# Patient Record
Sex: FEMALE | Race: WHITE | Employment: UNEMPLOYED | URBAN - METROPOLITAN AREA
[De-identification: names, ages, dates, MRNs, and addresses within clinical notes are randomized per-mention and may not be internally consistent; named-entity substitution may affect disease eponyms.]

---

## 2017-06-13 ENCOUNTER — OFFICE VISIT (OUTPATIENT)
Dept: VASCULAR SURGERY | Age: 27
End: 2017-06-13

## 2017-06-13 VITALS
DIASTOLIC BLOOD PRESSURE: 70 MMHG | BODY MASS INDEX: 27.2 KG/M2 | WEIGHT: 190 LBS | SYSTOLIC BLOOD PRESSURE: 118 MMHG | HEIGHT: 70 IN | RESPIRATION RATE: 18 BRPM | HEART RATE: 82 BPM

## 2017-06-13 DIAGNOSIS — I83.813 VARICOSE VEINS WITH PAIN, BILATERAL: Primary | ICD-10-CM

## 2017-06-13 DIAGNOSIS — M79.89 LEG SWELLING: ICD-10-CM

## 2017-06-13 NOTE — PROGRESS NOTES
Preeti Kimble is a 31 y.o. female    Chief Complaint   Patient presents with    Varicose Veins         1. Have you been to the ER, urgent care clinic or hospitalized since your last visit? NO.     2. Have you seen or consulted any other health care providers outside of the [address] [place] [address] since your last visit (Include any pap smears or colon screening)?  NO

## 2017-06-14 PROBLEM — I83.819 VARICOSE VEINS WITH PAIN: Status: ACTIVE | Noted: 2017-06-14

## 2017-06-14 PROBLEM — M79.89 LEG SWELLING: Status: ACTIVE | Noted: 2017-06-14

## 2017-06-14 NOTE — PROGRESS NOTES
Shena Keys    Chief Complaint   Patient presents with   [address] [place] [address] Varicose Veins       History and Physical    Ms. Jeet Adame is a 31year old female referred to our office for evaluation of her bilateral painful varicose veins. She states that her varicosities have worsened after every pregnancy, the last of which was last year. Since that time, especially on her left thigh, her varicose veins have become so painful that when her son brushes against them it causes her excruciating pain. She has tried compression stockings and elevation, but this seems to exacerbate her symptoms. Her stockings are knee high. She also does not like to take medications, but has tried Tumeric as an anti inflammatory measure during flare ups but has not received any relief from this. Past Medical History:   Diagnosis Date    Thyroid disease     hypothyroidism     There is no problem list on file for this patient. History reviewed. No pertinent surgical history. Current Outpatient Prescriptions   Medication Sig Dispense Refill    MAGNESIUM PO Take  by mouth.  CHOLECALCIFEROL, VITAMIN D3, (VITAMIN D3 PO) Take  by mouth.  OTHER        Not on File  Social History     Social History    Marital status: UNKNOWN     Spouse name: N/A    Number of children: N/A    Years of education: N/A     Occupational History    Not on file. Social History Main Topics    Smoking status: Never Smoker    Smokeless tobacco: Not on file    Alcohol use Yes      Comment: type of drink varies but about 1 drink per week.  Drug use: No    Sexual activity: Not on file     Other Topics Concern    Not on file     Social History Narrative      Family History   Problem Relation Age of Onset    Other Mother      neurological disorder, varicose veins    Diabetes Father      type 2    Cancer Maternal Grandmother      stage four lung, liver, kidneys and a tumor at base of skull.      Cancer Maternal Grandfather      colon cancer  COPD Maternal Grandfather     Alzheimer Paternal Grandmother        Review of Systems    Review of Systems   Constitutional: Negative for chills, diaphoresis, fever, malaise/fatigue and weight loss. HENT: Negative for hearing loss and sore throat. Eyes: Negative for blurred vision, photophobia and redness. Respiratory: Negative for cough, hemoptysis, shortness of breath and wheezing. Cardiovascular: Positive for leg swelling. Negative for chest pain, palpitations and orthopnea. Gastrointestinal: Negative for abdominal pain, blood in stool, constipation, diarrhea, heartburn, nausea and vomiting. Genitourinary: Negative for dysuria, frequency, hematuria and urgency. Musculoskeletal: Negative for back pain and myalgias. Skin: Negative for itching and rash. Neurological: Negative for dizziness, speech change, focal weakness, weakness and headaches. Endo/Heme/Allergies: Does not bruise/bleed easily. Psychiatric/Behavioral: Negative for depression and suicidal ideas.             Physical Exam:    Visit Vitals    /70 (BP 1 Location: Right arm, BP Patient Position: Sitting)    Pulse 82    Resp 18    Ht 5' 9.5\" (1.765 m)    Wt 190 lb (86.2 kg)    LMP 05/17/2017    BMI 27.66 kg/m2      Physical Examination: General appearance - alert, well appearing, and in no distress  Mental status - alert, oriented to person, place, and time  Eyes - sclera anicteric, left eye normal, right eye normal  Ears - right ear normal, left ear normal  Nose - normal and patent, no erythema, discharge or polyps  Mouth - mucous membranes moist, pharynx normal without lesions  Neck - supple, no significant adenopathy  Lymphatics - no palpable lymphadenopathy  Chest - clear to auscultation, no wheezes, rales or rhonchi, symmetric air entry  Heart - normal rate and regular rhythm  Abdomen - soft, nontender, nondistended, no masses or organomegaly  Extremities - Bilateral lower extremities warm and well perfused without significant edema. Bilateral, but left worse than right, large varicosities starting at mid calf and extending up into the high to mid thighs. No wounds or ulcerations. No erythema. No lipodermatosclerosis. No hyperpigmentation      Impression and Plan:    ICD-10-CM ICD-9-CM    1. Varicose veins with pain, bilateral I83.813 454.8 DUPLEX LOWER EXT VENOUS BILAT   2. Leg swelling M79.89 729.81 DUPLEX LOWER EXT VENOUS BILAT     Orders Placed This Encounter    DUPLEX LOWER EXT VENOUS BILAT    MAGNESIUM PO    CHOLECALCIFEROL, VITAMIN D3, (VITAMIN D3 PO)    OTHER     I told Ms. Andrea Salazar that I believe she has significant venous hypertension. I commended her on trying compression stockings, but I think she may find more relief from thigh high compression. We have directed her to a pharmacy to obtain those. We have also ordered a venous reflux study to identify the source of her problems. I believe that she would benefit from a venous closure and we will discuss this further when she returns in 1 month after her reflux study and trying her new stockings. Follow-up Disposition:  Return in about 4 weeks (around 7/11/2017). The treatment plan was reviewed with the patient in detail. The patient voiced understanding of this plan and all questions and concerns were addressed. The patient agrees with this plan. We discussed the signs and symptoms that would require earlier attention or intervention. The patient was given educational material related to his/her visit and the patient has voiced understanding of the material.     I appreciate the opportunity to participate in the care of your patient. I will be sure to keep you informed of any subsequent changes in the treatment plan. If you have any questions or concerns, please feel free to contact me. Darlene Manriquez MD    PLEASE NOTE:  This document has been produced using voice recognition software.  Unrecognized errors in transcription may be present.

## 2017-06-29 DIAGNOSIS — I83.813 VARICOSE VEINS OF BILATERAL LOWER EXTREMITIES WITH PAIN: Primary | ICD-10-CM

## 2017-06-29 NOTE — PROGRESS NOTES
Type of procedure: reflux study       Weight in appropriate: WNL    Name of prescriber: Dr. Caren Morgan    Date and time order was received: 6/29/17 @ 3:30 pm

## 2017-07-13 ENCOUNTER — OFFICE VISIT (OUTPATIENT)
Dept: VASCULAR SURGERY | Age: 27
End: 2017-07-13

## 2017-07-13 DIAGNOSIS — I83.813 VARICOSE VEINS WITH PAIN, BILATERAL: ICD-10-CM

## 2017-07-13 DIAGNOSIS — I83.813 VARICOSE VEINS OF BILATERAL LOWER EXTREMITIES WITH PAIN: ICD-10-CM

## 2017-07-13 DIAGNOSIS — M79.89 LEG SWELLING: ICD-10-CM

## 2017-07-13 NOTE — PROCEDURES
[facility]   *** FINAL REPORT ***    Name: Tomi Mccracken  MRN: [id number]       Outpatient  : 10 Jul 1990  HIS Order #: [id number]  [address] [facility] Visit #: [id number]  Date: 2017    TYPE OF TEST: Peripheral Venous Testing    REASON FOR TEST  Venous Insufficiency    Right Leg:-  Deep venous thrombosis:           No  Superficial venous thrombosis:    No  Deep venous insufficiency:        Yes  Superficial venous insufficiency: Yes    Left Leg:-  Deep venous thrombosis:           No  Superficial venous thrombosis:    No  Deep venous insufficiency:        Yes  Superficial venous insufficiency: Yes    Abnormal Valve Closure Times (seconds):    Right Common Femoral: 1.7    Right Deep Femoral:   1.7    Right GSV proximal:   3.7    Right GSV mid:        0.7    Left Common Femoral:  1.0    Left SFJ:             2.2    Vein Mapping:    Diam.   Depth  (mm)    Right Great Saphenous Vein:    High Thigh:      6.4    Mid Thigh:       5.0    Low Thigh:       3.4    Knee:            3.4    High Calf:       3.5    Low Calf: Ankle:    Right Small Saphenous Vein:    SPJ:    Mid Calf: Ankle:    Giacomini:  Accessory saph.:  Hinton :  Shyann Arthur :    Left Great Saphenous Vein:    High Thigh:      7.6    Mid Thigh:       2.5    Low Thigh:       1.3    Knee:    High Calf:    Low Calf: Ankle:    Left Small Saphenous Vein:    SPJ:    Mid Calf: Ankle:    Giacomini:  Accessory saph.:  Hinton :  Cole :      INTERPRETATION/FINDINGS  Duplex images were obtained using 2-D gray scale, color flow and  spectral doppler analysis. The reflux exam was performed in the reverse   trendelenburg position. Bilateral reflux:  1. No evidence of deep vein thrombosis in the common femoral, proximal   deep femoral, femoral, popliteal, posterior tibial and peroneal veins   bilaterally. 2. Deep venous reflux in the common femoral veins bilaterally and in  the right deep femoral vein.   3. Right great saphenous vein reflux  in the proximal to distal thigh. No reflux at the sapheno-femoral junction. 4. Left great saphenous vein reflux at the sapheno-femoral junction  with the vein becoming small in the distal thigh with varices  extending down the calf. 5. No small saphenous vein reflux bilaterally. 6. Biphasic posterior tibial artery flow bilaterally. ADDITIONAL COMMENTS    I have personally reviewed the data relevant to the interpretation of  this  study. TECHNOLOGIST: Lisandra Kwok RVT, BETOMS  Signed: 07/13/2017 03:36 PM    PHYSICIAN: Benita Guzman D.O.   Signed: 07/13/2017 05:20 PM

## 2017-07-25 ENCOUNTER — OFFICE VISIT (OUTPATIENT)
Dept: VASCULAR SURGERY | Age: 27
End: 2017-07-25

## 2017-07-25 VITALS
DIASTOLIC BLOOD PRESSURE: 64 MMHG | RESPIRATION RATE: 18 BRPM | WEIGHT: 190 LBS | HEART RATE: 80 BPM | HEIGHT: 70 IN | SYSTOLIC BLOOD PRESSURE: 108 MMHG | BODY MASS INDEX: 27.2 KG/M2

## 2017-07-25 DIAGNOSIS — I87.2 SAPHENOFEMORAL VENOUS REFLUX: ICD-10-CM

## 2017-07-25 DIAGNOSIS — I83.813 VARICOSE VEINS WITH PAIN, BILATERAL: Primary | ICD-10-CM

## 2017-08-15 DIAGNOSIS — I83.813 VARICOSE VEINS WITH PAIN, BILATERAL: Primary | ICD-10-CM

## 2017-08-15 RX ORDER — LORAZEPAM 1 MG/1
TABLET ORAL
Qty: 3 TAB | Refills: 0 | Status: SHIPPED | OUTPATIENT
Start: 2017-08-15 | End: 2017-09-27

## 2017-08-15 NOTE — TELEPHONE ENCOUNTER
Order for Ativan 1 mg to be taken as directed and brought to procedure #3/0 placed per Verbal order from Dr. Lamar Richmond . Pt verbalized understanding .

## 2017-08-31 ENCOUNTER — OFFICE VISIT (OUTPATIENT)
Dept: VASCULAR SURGERY | Age: 27
End: 2017-08-31

## 2017-08-31 DIAGNOSIS — I87.2 SAPHENOFEMORAL VENOUS REFLUX: ICD-10-CM

## 2017-08-31 DIAGNOSIS — I83.813 VARICOSE VEINS WITH PAIN, BILATERAL: Primary | ICD-10-CM

## 2017-08-31 NOTE — PROGRESS NOTES
Ms. Harvey Johansen presents to our office for a left greater saphenous vein ablation. She has large symptomatic left thigh varices with pain, swelling and heaviness despite wearing compression stockings. She has documented venous reflux on US. Today while examining her veins for closure it was noted that her saphenous vein splits very proximally in the upper thigh and both of these branches become too small to visualize. We decided not to proceed with a venous ablation and will schedule her for an open ligation of her saphenofemoral junction with vein stripping. Ms. Harvey Johansen understands this recommendation.

## 2017-10-03 ENCOUNTER — HOSPITAL ENCOUNTER (OUTPATIENT)
Dept: GENERAL RADIOLOGY | Age: 27
Discharge: HOME | End: 2017-10-03
Attending: SURGERY
Payer: COMMERCIAL

## 2017-10-03 ENCOUNTER — HOSPITAL ENCOUNTER (OUTPATIENT)
Dept: PREADMISSION TESTING | Age: 27
Discharge: HOME | End: 2017-10-03
Attending: SURGERY
Payer: COMMERCIAL

## 2017-10-03 DIAGNOSIS — Z01.818 PRE-OP EVALUATION: ICD-10-CM

## 2017-10-03 LAB
ATRIAL RATE: 68 BPM
BASOPHILS # BLD: 0 K/UL (ref 0–0.06)
BASOPHILS NFR BLD: 1 % (ref 0–2)
CALCULATED P AXIS, ECG09: 60 DEGREES
CALCULATED R AXIS, ECG10: 85 DEGREES
CALCULATED T AXIS, ECG11: 69 DEGREES
DIAGNOSIS, 93000: NORMAL
DIFFERENTIAL METHOD BLD: ABNORMAL
EOSINOPHIL # BLD: 0.3 K/UL (ref 0–0.4)
EOSINOPHIL NFR BLD: 4 % (ref 0–5)
ERYTHROCYTE [DISTWIDTH] IN BLOOD BY AUTOMATED COUNT: 15.6 % (ref 11.6–14.5)
HCT VFR BLD AUTO: 33.6 % (ref 35–45)
HGB BLD-MCNC: 11.3 G/DL (ref 12–16)
LYMPHOCYTES # BLD: 2.1 K/UL (ref 0.9–3.6)
LYMPHOCYTES NFR BLD: 32 % (ref 21–52)
MCH RBC QN AUTO: 27.4 PG (ref 24–34)
MCHC RBC AUTO-ENTMCNC: 33.6 G/DL (ref 31–37)
MCV RBC AUTO: 81.4 FL (ref 74–97)
MONOCYTES # BLD: 0.4 K/UL (ref 0.05–1.2)
MONOCYTES NFR BLD: 5 % (ref 3–10)
NEUTS SEG # BLD: 3.9 K/UL (ref 1.8–8)
NEUTS SEG NFR BLD: 58 % (ref 40–73)
P-R INTERVAL, ECG05: 142 MS
PLATELET # BLD AUTO: 190 K/UL (ref 135–420)
PMV BLD AUTO: 8.8 FL (ref 9.2–11.8)
Q-T INTERVAL, ECG07: 394 MS
QRS DURATION, ECG06: 84 MS
QTC CALCULATION (BEZET), ECG08: 418 MS
RBC # BLD AUTO: 4.13 M/UL (ref 4.2–5.3)
VENTRICULAR RATE, ECG03: 68 BPM
WBC # BLD AUTO: 6.8 K/UL (ref 4.6–13.2)

## 2017-10-03 PROCEDURE — 71010 XR CHEST SNGL V: CPT

## 2017-10-03 PROCEDURE — 85025 COMPLETE CBC W/AUTO DIFF WBC: CPT | Performed by: SURGERY

## 2017-10-03 PROCEDURE — 93005 ELECTROCARDIOGRAM TRACING: CPT

## 2017-10-03 PROCEDURE — 36415 COLL VENOUS BLD VENIPUNCTURE: CPT | Performed by: SURGERY

## 2017-10-19 ENCOUNTER — ANESTHESIA (OUTPATIENT)
Dept: SURGERY | Age: 27
End: 2017-10-19
Payer: COMMERCIAL

## 2017-10-19 ENCOUNTER — ANESTHESIA EVENT (OUTPATIENT)
Dept: SURGERY | Age: 27
End: 2017-10-19
Payer: COMMERCIAL

## 2017-10-19 ENCOUNTER — HOSPITAL ENCOUNTER (OUTPATIENT)
Age: 27
Setting detail: OUTPATIENT SURGERY
Discharge: HOME | End: 2017-10-19
Attending: SURGERY | Admitting: SURGERY
Payer: COMMERCIAL

## 2017-10-19 VITALS
RESPIRATION RATE: 16 BRPM | BODY MASS INDEX: 28.06 KG/M2 | WEIGHT: 196 LBS | OXYGEN SATURATION: 100 % | DIASTOLIC BLOOD PRESSURE: 80 MMHG | HEART RATE: 85 BPM | SYSTOLIC BLOOD PRESSURE: 116 MMHG | TEMPERATURE: 97.6 F | HEIGHT: 70 IN

## 2017-10-19 LAB
ABO + RH BLD: NORMAL
BLOOD GROUP ANTIBODIES SERPL: NORMAL
HCG SERPL QL: NEGATIVE
SPECIMEN EXP DATE BLD: NORMAL

## 2017-10-19 PROCEDURE — 76060000034 HC ANESTHESIA 1.5 TO 2 HR: Performed by: SURGERY

## 2017-10-19 PROCEDURE — 88304 TISSUE EXAM BY PATHOLOGIST: CPT | Performed by: SURGERY

## 2017-10-19 PROCEDURE — 74011250636 HC RX REV CODE- 250/636: Performed by: SURGERY

## 2017-10-19 PROCEDURE — 76010000153 HC OR TIME 1.5 TO 2 HR: Performed by: SURGERY

## 2017-10-19 PROCEDURE — 77030031139 HC SUT VCRL2 J&J -A: Performed by: SURGERY

## 2017-10-19 PROCEDURE — 77030010507 HC ADH SKN DERMBND J&J -B: Performed by: SURGERY

## 2017-10-19 PROCEDURE — 74011250636 HC RX REV CODE- 250/636: Performed by: ANESTHESIOLOGY

## 2017-10-19 PROCEDURE — 77030011267 HC ELECTRD BLD COVD -A: Performed by: SURGERY

## 2017-10-19 PROCEDURE — 74011250636 HC RX REV CODE- 250/636

## 2017-10-19 PROCEDURE — 86900 BLOOD TYPING SEROLOGIC ABO: CPT | Performed by: SURGERY

## 2017-10-19 PROCEDURE — 77030012508 HC MSK AIRWY LMA AMBU -A: Performed by: ANESTHESIOLOGY

## 2017-10-19 PROCEDURE — 77030020268 HC MISC GENERAL SUPPLY: Performed by: SURGERY

## 2017-10-19 PROCEDURE — 76210000006 HC OR PH I REC 0.5 TO 1 HR: Performed by: SURGERY

## 2017-10-19 PROCEDURE — 74011000250 HC RX REV CODE- 250: Performed by: SURGERY

## 2017-10-19 PROCEDURE — 77030020782 HC GWN BAIR PAWS FLX 3M -B: Performed by: SURGERY

## 2017-10-19 PROCEDURE — 74011000250 HC RX REV CODE- 250

## 2017-10-19 PROCEDURE — 84703 CHORIONIC GONADOTROPIN ASSAY: CPT | Performed by: SURGERY

## 2017-10-19 PROCEDURE — 77030010512 HC APPL CLP LIG J&J -C: Performed by: SURGERY

## 2017-10-19 PROCEDURE — 77030002933 HC SUT MCRYL J&J -A: Performed by: SURGERY

## 2017-10-19 PROCEDURE — 77030002996 HC SUT SLK J&J -A: Performed by: SURGERY

## 2017-10-19 PROCEDURE — 77030002987 HC SUT PROL J&J -B: Performed by: SURGERY

## 2017-10-19 PROCEDURE — 77030011640 HC PAD GRND REM COVD -A: Performed by: SURGERY

## 2017-10-19 PROCEDURE — 76210000022 HC REC RM PH II 1.5 TO 2 HR: Performed by: SURGERY

## 2017-10-19 PROCEDURE — 36415 COLL VENOUS BLD VENIPUNCTURE: CPT | Performed by: SURGERY

## 2017-10-19 RX ORDER — ONDANSETRON 2 MG/ML
INJECTION INTRAMUSCULAR; INTRAVENOUS AS NEEDED
Status: DISCONTINUED | OUTPATIENT
Start: 2017-10-19 | End: 2017-10-19 | Stop reason: HOSPADM

## 2017-10-19 RX ORDER — HYDROMORPHONE HYDROCHLORIDE 2 MG/ML
0.5 INJECTION, SOLUTION INTRAMUSCULAR; INTRAVENOUS; SUBCUTANEOUS
Status: DISCONTINUED | OUTPATIENT
Start: 2017-10-19 | End: 2017-10-20 | Stop reason: HOSPADM

## 2017-10-19 RX ORDER — FENTANYL CITRATE 50 UG/ML
50 INJECTION, SOLUTION INTRAMUSCULAR; INTRAVENOUS
Status: DISCONTINUED | OUTPATIENT
Start: 2017-10-19 | End: 2017-10-20 | Stop reason: HOSPADM

## 2017-10-19 RX ORDER — PROPOFOL 10 MG/ML
INJECTION, EMULSION INTRAVENOUS AS NEEDED
Status: DISCONTINUED | OUTPATIENT
Start: 2017-10-19 | End: 2017-10-19 | Stop reason: HOSPADM

## 2017-10-19 RX ORDER — OXYCODONE AND ACETAMINOPHEN 5; 325 MG/1; MG/1
1 TABLET ORAL AS NEEDED
Status: DISCONTINUED | OUTPATIENT
Start: 2017-10-19 | End: 2017-10-20 | Stop reason: HOSPADM

## 2017-10-19 RX ORDER — DEXAMETHASONE SODIUM PHOSPHATE 4 MG/ML
INJECTION, SOLUTION INTRA-ARTICULAR; INTRALESIONAL; INTRAMUSCULAR; INTRAVENOUS; SOFT TISSUE AS NEEDED
Status: DISCONTINUED | OUTPATIENT
Start: 2017-10-19 | End: 2017-10-19 | Stop reason: HOSPADM

## 2017-10-19 RX ORDER — CEFAZOLIN SODIUM 2 G/50ML
2 SOLUTION INTRAVENOUS ONCE
Status: COMPLETED | OUTPATIENT
Start: 2017-10-19 | End: 2017-10-19

## 2017-10-19 RX ORDER — FLUMAZENIL 0.1 MG/ML
0.2 INJECTION INTRAVENOUS
Status: DISCONTINUED | OUTPATIENT
Start: 2017-10-19 | End: 2017-10-20 | Stop reason: HOSPADM

## 2017-10-19 RX ORDER — NALOXONE HYDROCHLORIDE 0.4 MG/ML
0.4 INJECTION, SOLUTION INTRAMUSCULAR; INTRAVENOUS; SUBCUTANEOUS AS NEEDED
Status: DISCONTINUED | OUTPATIENT
Start: 2017-10-19 | End: 2017-10-20 | Stop reason: HOSPADM

## 2017-10-19 RX ORDER — FENTANYL CITRATE 50 UG/ML
INJECTION, SOLUTION INTRAMUSCULAR; INTRAVENOUS AS NEEDED
Status: DISCONTINUED | OUTPATIENT
Start: 2017-10-19 | End: 2017-10-19 | Stop reason: HOSPADM

## 2017-10-19 RX ORDER — MIDAZOLAM HYDROCHLORIDE 1 MG/ML
INJECTION, SOLUTION INTRAMUSCULAR; INTRAVENOUS AS NEEDED
Status: DISCONTINUED | OUTPATIENT
Start: 2017-10-19 | End: 2017-10-19 | Stop reason: HOSPADM

## 2017-10-19 RX ORDER — ONDANSETRON 2 MG/ML
4 INJECTION INTRAMUSCULAR; INTRAVENOUS ONCE
Status: ACTIVE | OUTPATIENT
Start: 2017-10-19 | End: 2017-10-19

## 2017-10-19 RX ORDER — SODIUM CHLORIDE, SODIUM LACTATE, POTASSIUM CHLORIDE, CALCIUM CHLORIDE 600; 310; 30; 20 MG/100ML; MG/100ML; MG/100ML; MG/100ML
125 INJECTION, SOLUTION INTRAVENOUS CONTINUOUS
Status: DISCONTINUED | OUTPATIENT
Start: 2017-10-19 | End: 2017-10-20 | Stop reason: HOSPADM

## 2017-10-19 RX ORDER — SODIUM CHLORIDE, SODIUM LACTATE, POTASSIUM CHLORIDE, CALCIUM CHLORIDE 600; 310; 30; 20 MG/100ML; MG/100ML; MG/100ML; MG/100ML
100 INJECTION, SOLUTION INTRAVENOUS CONTINUOUS
Status: DISCONTINUED | OUTPATIENT
Start: 2017-10-19 | End: 2017-10-20 | Stop reason: HOSPADM

## 2017-10-19 RX ORDER — GLYCOPYRROLATE 0.2 MG/ML
INJECTION INTRAMUSCULAR; INTRAVENOUS AS NEEDED
Status: DISCONTINUED | OUTPATIENT
Start: 2017-10-19 | End: 2017-10-19 | Stop reason: HOSPADM

## 2017-10-19 RX ORDER — LIDOCAINE HYDROCHLORIDE 20 MG/ML
INJECTION, SOLUTION EPIDURAL; INFILTRATION; INTRACAUDAL; PERINEURAL AS NEEDED
Status: DISCONTINUED | OUTPATIENT
Start: 2017-10-19 | End: 2017-10-19 | Stop reason: HOSPADM

## 2017-10-19 RX ORDER — OXYCODONE AND ACETAMINOPHEN 5; 325 MG/1; MG/1
2 TABLET ORAL
Qty: 42 TAB | Refills: 0 | Status: SHIPPED | OUTPATIENT
Start: 2017-10-19

## 2017-10-19 RX ADMIN — FENTANYL CITRATE 50 MCG: 50 INJECTION, SOLUTION INTRAMUSCULAR; INTRAVENOUS at 07:42

## 2017-10-19 RX ADMIN — SODIUM CHLORIDE, SODIUM LACTATE, POTASSIUM CHLORIDE, AND CALCIUM CHLORIDE 125 ML/HR: 600; 310; 30; 20 INJECTION, SOLUTION INTRAVENOUS at 10:00

## 2017-10-19 RX ADMIN — PROPOFOL 50 MG: 10 INJECTION, EMULSION INTRAVENOUS at 07:42

## 2017-10-19 RX ADMIN — HYDROMORPHONE HYDROCHLORIDE 0.5 MG: 2 INJECTION INTRAMUSCULAR; INTRAVENOUS; SUBCUTANEOUS at 09:42

## 2017-10-19 RX ADMIN — SODIUM CHLORIDE, SODIUM LACTATE, POTASSIUM CHLORIDE, AND CALCIUM CHLORIDE: 600; 310; 30; 20 INJECTION, SOLUTION INTRAVENOUS at 08:37

## 2017-10-19 RX ADMIN — SODIUM CHLORIDE, SODIUM LACTATE, POTASSIUM CHLORIDE, AND CALCIUM CHLORIDE 125 ML/HR: 600; 310; 30; 20 INJECTION, SOLUTION INTRAVENOUS at 06:48

## 2017-10-19 RX ADMIN — CEFAZOLIN SODIUM 2 G: 2 SOLUTION INTRAVENOUS at 07:33

## 2017-10-19 RX ADMIN — MIDAZOLAM HYDROCHLORIDE 2 MG: 1 INJECTION, SOLUTION INTRAMUSCULAR; INTRAVENOUS at 07:33

## 2017-10-19 RX ADMIN — DEXAMETHASONE SODIUM PHOSPHATE 4 MG: 4 INJECTION, SOLUTION INTRA-ARTICULAR; INTRALESIONAL; INTRAMUSCULAR; INTRAVENOUS; SOFT TISSUE at 08:27

## 2017-10-19 RX ADMIN — LIDOCAINE HYDROCHLORIDE 60 MG: 20 INJECTION, SOLUTION EPIDURAL; INFILTRATION; INTRACAUDAL; PERINEURAL at 07:41

## 2017-10-19 RX ADMIN — FENTANYL CITRATE 50 MCG: 50 INJECTION, SOLUTION INTRAMUSCULAR; INTRAVENOUS at 08:31

## 2017-10-19 RX ADMIN — GLYCOPYRROLATE 0.2 MG: 0.2 INJECTION INTRAMUSCULAR; INTRAVENOUS at 07:36

## 2017-10-19 RX ADMIN — PROPOFOL 150 MG: 10 INJECTION, EMULSION INTRAVENOUS at 07:41

## 2017-10-19 RX ADMIN — FENTANYL CITRATE 50 MCG: 50 INJECTION, SOLUTION INTRAMUSCULAR; INTRAVENOUS at 08:00

## 2017-10-19 RX ADMIN — ONDANSETRON 4 MG: 2 INJECTION INTRAMUSCULAR; INTRAVENOUS at 07:36

## 2017-10-19 RX ADMIN — PROPOFOL 50 MG: 10 INJECTION, EMULSION INTRAVENOUS at 08:02

## 2017-10-19 RX ADMIN — FENTANYL CITRATE 50 MCG: 50 INJECTION, SOLUTION INTRAMUSCULAR; INTRAVENOUS at 08:51

## 2017-10-19 NOTE — DISCHARGE INSTRUCTIONS
May shower tomorrow. Follow all Dr. Alessia Carmona postoperative instructions. Resume a regular diet as tolerated. Call Dr. Alessia Carmona office for any problems or concerns. Vein Ligation and Stripping: What to Expect at [address] will have some pain from the cuts (incisions) the doctor made. Your leg may feel stiff or sore for the first 1 to 2 weeks. Your doctor will give you pain medicine for this. You can expect your leg to be very bruised at first. This is a normal part of recovery and may last 2 to 3 weeks. You may need to wear tight bandages, called compression dressings, on your leg for the first few days after surgery. This can help reduce bruising. If you have stitches, they may dissolve on their own. Or your doctor may take them out 7 to 14 days after your surgery. You will need to take it easy at home for 3 to 7 days after the surgery. The amount of time it takes for you to recover depends on how many veins were removed. After surgery, problems caused by the varicose veins may go away. Removing varicose veins usually does not cause circulation problems, because other veins in the legs will take over the work of the veins that were removed. This care sheet gives you a general idea about how long it will take for you to recover. But each person recovers at a different pace. Follow the steps below to get better as quickly as possible. How can you care for yourself at home? Activity  · Rest when you feel tired. Getting enough sleep will help you recover. · Follow your doctor's instructions about activity. Your doctor may recommend that you rest in bed or limit your activity for several days after surgery. This can help reduce bruising. · Resume activity as your doctor tells you. Start by walking a little more than you did the day before. Bit by bit, increase the amount you walk. Walking boosts blood flow.   · Avoid strenuous activities, such as bicycle riding, jogging, weight lifting, or aerobic exercise, for 2 weeks or until your doctor says it is okay. If you do strenuous activities too soon after the surgery, you may have some bleeding from your incisions. If this happens, lie down with your leg propped up on pillows. If the bleeding does not stop, call your doctor. · Ask your doctor when you can drive again. · You will probably need to take 3 to 7 days off from work. It depends on the type of work you do and how you feel. · You may shower after your doctor says it is okay to take off the compression dressings. Do not take a bath for the first 2 weeks, or until your doctor tells you it is okay. Diet  · You can eat your normal diet. If your stomach is upset, try bland, low-fat foods like plain rice, broiled chicken, toast, and yogurt. · Drink plenty of fluids (unless your doctor tells you not to). · You may notice that your bowel movements are not regular right after your surgery. This is common. You may want to take a fiber supplement every day. If you have not had a bowel movement after a couple of days, ask your doctor about taking a mild laxative. Medicines  · Your doctor will tell you if and when you can restart your medicines. He or she will also give you instructions about taking any new medicines. · If you take blood thinners, such as warfarin (Coumadin), clopidogrel (Plavix), or aspirin, be sure to talk to your doctor. He or she will tell you if and when to start taking those medicines again. Make sure that you understand exactly what your doctor wants you to do. · Be safe with medicines. Take your medicines exactly as prescribed. Call your doctor if you think you are having a problem with your medicine. · Take pain medicines exactly as directed. ¨ If the doctor gave you a prescription medicine for pain, take it as prescribed. ¨ If you are not taking a prescription pain medicine, ask your doctor if you can take an over-the-counter medicine.   · If you think your pain medicine is making you sick to your stomach:  ¨ Take your medicine after meals (unless your doctor has told you not to). ¨ Ask your doctor for a different pain medicine. · If your doctor prescribed antibiotics, take them as directed. Do not stop taking them just because you feel better. You need to take the full course of antibiotics. · Your doctor may prescribe a blood thinner when you go home. This helps prevent blood clots. Be sure you get instructions about how to take your medicine safely. Blood thinners can cause serious bleeding problems. Incision care  · If you have compression dressings on your leg, follow your doctor's instructions about when to take them off. · If you have strips of tape on the incisions, leave the tape on for a week or until it falls off. · After your doctor says it is okay to take off the compression dressings, wash the area daily with warm, soapy water and pat it dry. Don't use hydrogen peroxide or alcohol, which can slow healing. You may cover the area with a gauze bandage if it weeps or rubs against clothing. Change the bandage every day. · Keep the area clean and dry. Ice and elevation  · To reduce pain, put ice or a cold pack on your leg for 10 to 20 minutes at a time. Do this every few hours. Put a thin cloth between the ice and your skin. · Prop up your leg on a pillow when you ice it or anytime you sit or lie down during the 2 to 3 days after surgery. Try to keep it above the level of your heart. This will help reduce bruising. Follow-up care is a key part of your treatment and safety. Be sure to make and go to all appointments, and call your doctor if you are having problems. It's also a good idea to know your test results and keep a list of the medicines you take. When should you call for help? Call 911 anytime you think you may need emergency care. For example, call if:  · You passed out (lost consciousness). · You have severe trouble breathing.   · You have sudden chest pain and shortness of breath, or you cough up blood. Call your doctor now or seek immediate medical care if:  · You have severe pain in your leg, or it becomes cold, pale, blue, tingly, or numb. · You have pain that does not get better after you take pain pills. · You have loose stitches, or your incisions come open. · You are bleeding a lot from the incisions. · You have signs of infection, such as:  ¨ Increased pain, swelling, warmth, or redness. ¨ Red streaks leading from your incisions. ¨ Pus draining from your incisions. ¨ A fever. · You are sick to your stomach or cannot keep fluids down. Watch closely for any changes in your health, and be sure to contact your doctor if:  · You are not getting better as expected. Where can you learn more? Go to http://jagjit-michelle.info/. Enter O190 in the search box to learn more about \"Vein Ligation and Stripping: What to Expect at Home. \"  Current as of: March 20, 2017  Content Version: 11.3  © 3587-8107 [employer]. Care instructions adapted under license by [employer] (which disclaims liability or warranty for this information). If you have questions about a medical condition or this instruction, always ask your healthcare professional. [address] any warranty or liability for your use of this information.     DISCHARGE SUMMARY from Nurse    The following personal items are in your possession at time of discharge:    Dental Appliances: None  Visual Aid: None     Home Medications: None  Jewelry: None  Clothing: Pants, Shirt, Undergarments, Slippers (meggan)  Other Valuables: None             PATIENT INSTRUCTIONS:    After general anesthesia or intravenous sedation, for 24 hours or while taking prescription Narcotics:  · Limit your activities  · Do not drive and operate hazardous machinery  · Do not make important personal or business decisions  · Do  not drink alcoholic beverages  · If you have not urinated within 8 hours after discharge, please contact your surgeon on call. Report the following to your surgeon:  · Excessive pain, swelling, redness or odor of or around the surgical area  · Temperature over 100.5  · Nausea and vomiting lasting longer than 4 hours or if unable to take medications  · Any signs of decreased circulation or nerve impairment to extremity: change in color, persistent  numbness, tingling, coldness or increase pain  · Any questions        What to do at Home:  Recommended activity: Activity as tolerated and no driving for today,     If you experience any of the following symptoms as above, please follow up with Dr. Mario Parikh at [phone]      *  Please give a list of your current medications to your Primary Care Provider. *  Please update this list whenever your medications are discontinued, doses are      changed, or new medications (including over-the-counter products) are added. *  Please carry medication information at all times in case of emergency situations. These are general instructions for a healthy lifestyle:    No smoking/ No tobacco products/ Avoid exposure to second hand smoke    Surgeon General's Warning:  Quitting smoking now greatly reduces serious risk to your health. Obesity, smoking, and sedentary lifestyle greatly increases your risk for illness    A healthy diet, regular physical exercise & weight monitoring are important for maintaining a healthy lifestyle    You may be retaining fluid if you have a history of heart failure or if you experience any of the following symptoms:  Weight gain of 3 pounds or more overnight or 5 pounds in a week, increased swelling in our hands or feet or shortness of breath while lying flat in bed. Please call your doctor as soon as you notice any of these symptoms; do not wait until your next office visit.     Recognize signs and symptoms of STROKE:    F-face looks uneven    A-arms unable to move or move unevenly    S-speech slurred or non-existent    T-time-call 911 as soon as signs and symptoms begin-DO NOT go       Back to bed or wait to see if you get better-TIME IS BRAIN. Warning Signs of HEART ATTACK     Call 911 if you have these symptoms:   Chest discomfort. Most heart attacks involve discomfort in the center of the chest that lasts more than a few minutes, or that goes away and comes back. It can feel like uncomfortable pressure, squeezing, fullness, or pain.  Discomfort in other areas of the upper body. Symptoms can include pain or discomfort in one or both arms, the back, neck, jaw, or stomach.  Shortness of breath with or without chest discomfort.  Other signs may include breaking out in a cold sweat, nausea, or lightheadedness. Don't wait more than five minutes to call 911 - MINUTES MATTER! Fast action can save your life. Calling 911 is almost always the fastest way to get lifesaving treatment. Emergency Medical Services staff can begin treatment when they arrive -- up to an hour sooner than if someone gets to the hospital by car. Patient armband removed and shredded  The discharge information has been reviewed with the patient and spouse. The patient and spouse verbalized understanding. Discharge medications reviewed with the patient and spouse and appropriate educational materials and side effects teaching were provided.

## 2017-10-19 NOTE — PERIOP NOTES
Patient very sleepy and nauseated. Will allow patient to rest and will observe. Vital signs stable and no c/o pain. Dereje Moreau

## 2017-10-19 NOTE — PERIOP NOTES
TRANSFER - OUT REPORT:    Verbal report given to Samara Pryor RN (name) on Chau Campos  being transferred to phase 2(unit) for routine post - op       Report consisted of patients Situation, Background, Assessment and   Recommendations(SBAR). Information from the following report(s) SBAR, Intake/Output and MAR was reviewed with the receiving nurse. Lines:   Peripheral IV 10/19/17 Left Hand (Active)   Site Assessment Clean, dry, & intact 10/19/2017  9:29 AM   Phlebitis Assessment 0 10/19/2017  9:29 AM   Infiltration Assessment 0 10/19/2017  9:29 AM   Dressing Status Clean, dry, & intact 10/19/2017  9:29 AM   Dressing Type Transparent;Tape 10/19/2017  9:29 AM   Hub Color/Line Status Green; Infusing 10/19/2017  9:29 AM        Opportunity for questions and clarification was provided.       Patient transported with:   [facility]

## 2017-10-19 NOTE — ANESTHESIA PREPROCEDURE EVALUATION
Anesthetic History          Comments: [place] teeth surgery without complications, denies family history of anesthetic complications     Review of Systems / Medical History  Patient summary reviewed, nursing notes reviewed and pertinent labs reviewed    Pulmonary            Asthma : well controlled  Pertinent negatives: No COPD, recent URI, sleep apnea and smoker  Comments: Childhood asthma   Neuro/Psych         Psychiatric history  Pertinent negatives: No seizures, neuromuscular disease, TIA and CVA   Cardiovascular  Within defined limits                Exercise tolerance: >4 METS     GI/Hepatic/Renal  Within defined limits              Endo/Other      Hypothyroidism: well controlled    Pertinent negatives: No diabetes, hyperthyroidism, obesity, morbid obesity and blood dyscrasia   Other Findings              Physical Exam    Airway  Mallampati: II  TM Distance: 4 - 6 cm  Neck ROM: normal range of motion   Mouth opening: Normal     Cardiovascular  Regular rate and rhythm,  S1 and S2 normal,  no murmur, click, rub, or gallop             Dental  No notable dental hx       Pulmonary  Breath sounds clear to auscultation               Abdominal  GI exam deferred       Other Findings            Anesthetic Plan    ASA: 2  Anesthesia type: general          Induction: Intravenous  Anesthetic plan and risks discussed with: Patient and Spouse      GA/LMA

## 2017-10-19 NOTE — ANESTHESIA POSTPROCEDURE EVALUATION
Post-Anesthesia Evaluation & Assessment    Visit Vitals    /68    Pulse (!) 59    Temp 37.2 °C (99 °F)    Resp 11    Ht 5' 9.5\" (1.765 m)    Wt 88.9 kg (196 lb)    SpO2 98%    BMI 28.53 kg/m2       Nausea/Vomiting: Controlled. Post-operative hydration adequate. Pain Scale 1: Numeric (0 - 10) (10/19/17 0958)  Pain Intensity 1: 2 (10/19/17 0958)   Managed    Pain score at or below stated goal level. Mental status & Level of consciousness: alert and oriented x 3    Neurological status: moves all extremities, sensation grossly intact    Pulmonary status: airway patent, adequate oxygenation. Complications related to anesthesia: none    Patient has met all PACU discharge requirements.       Saturnino Lynne DO

## 2017-10-19 NOTE — H&P
Ms. Gerhardt Poche returns to our office for continued evaluation of her venous reflux disease. Since our last visit she has been wearing compression stockings and states she has noticed an improvement in her symptoms. She has less fullness and tenderness in her varicose veins. She still has discomfort despite this and is interested in additional options.           Past Medical History:   Diagnosis Date    Thyroid disease       hypothyroidism           Patient Active Problem List   Diagnosis Code    Varicose veins with pain I83.819    Leg swelling M79.89      History reviewed. No pertinent surgical history. Current Outpatient Prescriptions   Medication Sig Dispense Refill    MAGNESIUM PO Take  by mouth.        CHOLECALCIFEROL, VITAMIN D3, (VITAMIN D3 PO) Take  by mouth.        OTHER                 Allergies   Allergen Reactions    Bee Venom Protein (Honey Bee) Anaphylaxis      Social History            Social History    Marital status:        Spouse name: N/A    Number of children: N/A    Years of education: N/A          Occupational History    Not on file.              Social History Main Topics     Smoking status: Never Smoker     Smokeless tobacco: Never Used     Alcohol use Yes         Comment: type of drink varies but about 1 drink per week.  Drug use: No     Sexual activity: Not on file            Other Topics Concern    Not on file      Social History Narrative             Family History   Problem Relation Age of Onset    Other Mother         neurological disorder, varicose veins    Diabetes Father         type 2    Cancer Maternal Grandmother         stage four lung, liver, kidneys and a tumor at base of skull.  Cancer Maternal Grandfather         colon cancer    COPD Maternal Grandfather      Alzheimer Paternal Grandmother           Review of Systems    Review of Systems   Constitutional: Negative for chills, diaphoresis, fever, malaise/fatigue and weight loss. HENT: Negative for hearing loss and sore throat. Eyes: Negative for blurred vision, photophobia and redness. Respiratory: Negative for cough, hemoptysis, shortness of breath and wheezing. Cardiovascular: Positive for leg swelling. Negative for chest pain, palpitations and orthopnea. Gastrointestinal: Negative for abdominal pain, blood in stool, constipation, diarrhea, heartburn, nausea and vomiting. Genitourinary: Negative for dysuria, frequency, hematuria and urgency. Musculoskeletal: Negative for back pain and myalgias. Skin: Negative for itching and rash. Neurological: Negative for dizziness, speech change, focal weakness, weakness and headaches. Endo/Heme/Allergies: Does not bruise/bleed easily. Psychiatric/Behavioral: Negative for depression and suicidal ideas.                Physical Exam:         Visit Vitals    /64    Pulse 80    Resp 18    Ht 5' 9.5\" (1.765 m)    Wt 190 lb (86.2 kg)    LMP 07/02/2017 (Approximate)    BMI 27.66 kg/m2      Physical Examination: General appearance - alert, well appearing, and in no distress  Mental status - alert, oriented to person, place, and time  Eyes - sclera anicteric, left eye normal, right eye normal  Ears - right ear normal, left ear normal  Nose - normal and patent, no erythema, discharge or polyps  Mouth - mucous membranes moist, pharynx normal without lesions  Extremities - Large prominent varicose veins in left medial thigh and calf. Non tender to palpation. No significant edema or erythema.         Impression and Plan:      ICD-10-CM ICD-9-CM     1. Varicose veins with pain, bilateral I83.813 454. 8     2. 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 Darwin Quiroga has noticed an improvement in her symptoms with compression stockings. I do agree that she will require some intervention however to relieve her symptoms.   Her venous reflux study shows saphenofemoral reflux on the left with a high bifurcation of her saphenous vein. Her saphenous vein was not suitable for an ablation so we will perform a high ligation with stab phlebectomies today for her left GSV.

## 2017-10-19 NOTE — OP NOTES
[address] [place] [address]  OPERATIVE REPORT    Name:  Jeremiah Goncalves  MR#:  [id number]  :  1990  Account #:  [de-identified]  Date of Adm:  10/19/2017  Date of Surgery:  10/19/2017      PREOPERATIVE DIAGNOSIS: Symptomatic, with venous reflux of  large left thigh varicose vein. POSTOPERATIVE DIAGNOSIS: Symptomatic, with venous reflux of  large left thigh varicose vein. PROCEDURES PERFORMED: High ligation of left greater saphenous  vein with stab phlebectomies x 10 on the left. SURGEON: Beranrda Terrazas MD.    ANESTHESIA: General.    ESTIMATED BLOOD LOSS: 50 mL. PACKS AND DRAINS: None. IMPLANTS: None. SPECIMENS REMOVED: Left leg vein. COMPLICATIONS: None. CONDITION: To recovery stable. FINDINGS: Large saphenous vein at the saphenofemoral junction with  a high bifurcation and satisfactory appearance of the vein after ligation  with hemostasis confirmed after ligation and phlebectomy. INDICATIONS FOR PROCEDURE: The patient is a 58-year-old  female with symptomatic varicose veins and saphenofemoral junction  reflux. The patient's vein had a high bifurcation and both of these  branches were not large enough to accommodate for a radiofrequency  ablation so decision was made to take the patient for a high ligation  and a stab phlebectomy. Informed consent was obtained. DESCRIPTION OF PROCEDURE: On 10/19/2017 the patient presents  to the operating room, identified by name and ID bracelet by the entire  team. Once this was done, the patient was placed on the operating  table in supine position. After appropriate depth of anesthesia was  obtained, the patient was prepped and draped and time-out performed. At this point, the patient received preoperative dose of antibiotics and a  standard left groin incision was made overlying the saphenofemoral  junction.  Blunt and sharp dissection was used to dissect out  the saphenous vein and we followed the saphenous vein to the ostium  and then ligated the main saphenous vein with double ligated silk  sutures. We then ligated both branches of the saphenous vein in this  groin incision in order to try to remove the saphenofemoral junction  reflux. Once this was completed, we irrigated out the wound and  closed the incision with 3 layers of running Vicryl, followed  by subcuticular 4-0 Monocryl for the skin and Dermabond. We  then turned our attention to the varicose veins in the left thigh and  medial calf. We made an incision overlying the proximal medial calf  with an 11 blade. It was approximately 1 cm in length. We used a  hemostat to dissect through the tissues and a vein hook to hook the  vein and then performed a stab phlebectomy in standard fashion. We  held manual pressure for hemostasis and closed the incision with  interrupted Monocryl suture. We then repeated this 10 subsequent  times over the other prominent varicosities for her thigh and calf. Once  this was completed, we instilled local for local anesthesia and dressed  the incisions with Dermabond and then placed 4x4, Kerlix, and Ace  wrap from toes to thigh. At the end of the procedure, all counts were  correct x2. I was present and scrubbed for the entire procedure.         MD Gueor Hinojosa / Janna Arce  D:  10/19/2017   09:06  T:  10/19/2017   09:41  Job #:  [id number]

## 2017-10-19 NOTE — IP AVS SNAPSHOT
[address] [place] [id number] Patient: Anupam Burkett MRN: [id number] THV:[id number] You are allergic to the following Allergen Reactions Bee Venom Protein (Honey Bee) Anaphylaxis Recent Documentation Height Weight BMI OB Status Smoking Status 1.765 m 88.9 kg 28.53 kg/m2 Having regular periods Never Smoker Emergency Contacts Name Discharge Info Relation Home Work Mobile Juliet CAREGIVER [3] Spouse [3]   [phone] About your hospitalization You were admitted on:  October 19, 2017 You last received care in the:  [facility] PHASE 2 RECOVERY You were discharged on:  October 19, 2017 Unit phone number:    
  
Why you were hospitalized Your primary diagnosis was:  Not on File Providers Seen During Your Hospitalizations Provider Role Specialty Primary office phone Emily Roach MD Attending Provider Vascular Surgery [phone] Your Primary Care Physician (PCP) Primary Care Physician Office Phone Office Fax NONE ** None ** ** None ** Follow-up Information Follow up With Details Comments Contact Info None   None ([phone]) Patient stated that they have no PCP Emily Roach MD Go in 2 week(s)  [address] [facility] [address] [facility] 
[phone] Current Discharge Medication List  
  
START taking these medications Dose & Instructions Dispensing Information Comments Morning Noon Evening Bedtime  
 oxyCODONE-acetaminophen 5-325 mg per tablet Commonly known as:  PERCOCET Your last dose was: Your next dose is:    
   
   
 Dose:  2 Tab Take 2 Tabs by mouth every six (6) hours as needed for Pain. Max Daily Amount: 8 Tabs. Quantity:  42 Tab Refills:  0 CONTINUE these medications which have NOT CHANGED Dose & Instructions Dispensing Information Comments Morning Noon Evening Bedtime MAGNESIUM PO Your last dose was: Your next dose is:    
   
   
 Dose:  1 Drop Take 1 Drop by mouth daily. Refills:  0  
     
   
   
   
  
 * OTHER Your last dose was: Your next dose is:    
   
   
  Refills:  0  
     
   
   
   
  
 * OTHER Your last dose was: Your next dose is:    
   
   
 Dose:  2 Cap 2 Caps. Natural thyroid Refills:  0  
     
   
   
   
  
 * OTHER Your last dose was: Your next dose is:    
   
   
 Dose:  1 Drop 1 Drop. Vitamin K Refills:  0  
     
   
   
   
  
 VITAMIN D3 PO Your last dose was: Your next dose is: Take  by mouth. Refills:  0  
     
   
   
   
  
 * Notice: This list has 3 medication(s) that are the same as other medications prescribed for you. Read the directions carefully, and ask your doctor or other care provider to review them with you. Where to Get Your Medications Information on where to get these meds will be given to you by the nurse or doctor. ! Ask your nurse or doctor about these medications  
  oxyCODONE-acetaminophen 5-325 mg per tablet Discharge Instructions May shower tomorrow. Follow all Dr. Christiana Jackson postoperative instructions. Resume a regular diet as tolerated. Call Dr. Christiana Jackson office for any problems or concerns. Vein Ligation and Stripping: What to Expect at Home Your Recovery You will have some pain from the cuts (incisions) the doctor made. Your leg may feel stiff or sore for the first 1 to 2 weeks. Your doctor will give you pain medicine for this. You can expect your leg to be very bruised at first. This is a normal part of recovery and may last 2 to 3 weeks. You may need to wear tight bandages, called compression dressings, on your leg for the first few days after surgery. This can help reduce bruising. If you have stitches, they may dissolve on their own. Or your doctor may take them out 7 to 14 days after your surgery. You will need to take it easy at home for 3 to 7 days after the surgery. The amount of time it takes for you to recover depends on how many veins were removed. After surgery, problems caused by the varicose veins may go away. Removing varicose veins usually does not cause circulation problems, because other veins in the legs will take over the work of the veins that were removed. This care sheet gives you a general idea about how long it will take for you to recover. But each person recovers at a different pace. Follow the steps below to get better as quickly as possible. How can you care for yourself at home? Activity · Rest when you feel tired. Getting enough sleep will help you recover. · Follow your doctor's instructions about activity. Your doctor may recommend that you rest in bed or limit your activity for several days after surgery. This can help reduce bruising. · Resume activity as your doctor tells you. Start by walking a little more than you did the day before. Bit by bit, increase the amount you walk. Walking boosts blood flow. · Avoid strenuous activities, such as bicycle riding, jogging, weight lifting, or aerobic exercise, for 2 weeks or until your doctor says it is okay. If you do strenuous activities too soon after the surgery, you may have some bleeding from your incisions. If this happens, lie down with your leg propped up on pillows. If the bleeding does not stop, call your doctor. · Ask your doctor when you can drive again. · You will probably need to take 3 to 7 days off from work. It depends on the type of work you do and how you feel. · You may shower after your doctor says it is okay to take off the compression dressings. Do not take a bath for the first 2 weeks, or until your doctor tells you it is okay. Diet · You can eat your normal diet. If your stomach is upset, try bland, low-fat foods like plain rice, broiled chicken, toast, and yogurt. · Drink plenty of fluids (unless your doctor tells you not to). · You may notice that your bowel movements are not regular right after your surgery. This is common. You may want to take a fiber supplement every day. If you have not had a bowel movement after a couple of days, ask your doctor about taking a mild laxative. Medicines · Your doctor will tell you if and when you can restart your medicines. He or she will also give you instructions about taking any new medicines. · If you take blood thinners, such as warfarin (Coumadin), clopidogrel (Plavix), or aspirin, be sure to talk to your doctor. He or she will tell you if and when to start taking those medicines again. Make sure that you understand exactly what your doctor wants you to do. · Be safe with medicines. Take your medicines exactly as prescribed. Call your doctor if you think you are having a problem with your medicine. · Take pain medicines exactly as directed. ¨ If the doctor gave you a prescription medicine for pain, take it as prescribed. ¨ If you are not taking a prescription pain medicine, ask your doctor if you can take an over-the-counter medicine. · If you think your pain medicine is making you sick to your stomach: 
¨ Take your medicine after meals (unless your doctor has told you not to). ¨ Ask your doctor for a different pain medicine. · If your doctor prescribed antibiotics, take them as directed. Do not stop taking them just because you feel better. You need to take the full course of antibiotics. · Your doctor may prescribe a blood thinner when you go home. This helps prevent blood clots. Be sure you get instructions about how to take your medicine safely. Blood thinners can cause serious bleeding problems. Incision care · If you have compression dressings on your leg, follow your doctor's instructions about when to take them off. · If you have strips of tape on the incisions, leave the tape on for a week or until it falls off. · After your doctor says it is okay to take off the compression dressings, wash the area daily with warm, soapy water and pat it dry. Don't use hydrogen peroxide or alcohol, which can slow healing. You may cover the area with a gauze bandage if it weeps or rubs against clothing. Change the bandage every day. · Keep the area clean and dry. Ice and elevation · To reduce pain, put ice or a cold pack on your leg for 10 to 20 minutes at a time. Do this every few hours. Put a thin cloth between the ice and your skin. · Prop up your leg on a pillow when you ice it or anytime you sit or lie down during the 2 to 3 days after surgery. Try to keep it above the level of your heart. This will help reduce bruising. Follow-up care is a key part of your treatment and safety. Be sure to make and go to all appointments, and call your doctor if you are having problems. It's also a good idea to know your test results and keep a list of the medicines you take. When should you call for help? Call 911 anytime you think you may need emergency care. For example, call if: 
· You passed out (lost consciousness). · You have severe trouble breathing. · You have sudden chest pain and shortness of breath, or you cough up blood. Call your doctor now or seek immediate medical care if: 
· You have severe pain in your leg, or it becomes cold, pale, blue, tingly, or numb. · You have pain that does not get better after you take pain pills. · You have loose stitches, or your incisions come open. · You are bleeding a lot from the incisions. · You have signs of infection, such as: 
¨ Increased pain, swelling, warmth, or redness. ¨ Red streaks leading from your incisions. ¨ Pus draining from your incisions. ¨ A fever. · You are sick to your stomach or cannot keep fluids down. Watch closely for any changes in your health, and be sure to contact your doctor if: 
· You are not getting better as expected. Where can you learn more? Go to http://jagjit-michelle.info/. Enter M892 in the search box to learn more about \"Vein Ligation and Stripping: What to Expect at Home. \" Current as of: March 20, 2017 Content Version: 11.3 © 4226-2180 "[employer]. Care instructions adapted under license by [employer] (which disclaims liability or warranty for this information). If you have questions about a medical condition or this instruction, always ask your healthcare professional. [place] [address] any warranty or liability for your use of this information. DISCHARGE SUMMARY from Nurse The following personal items are in your possession at time of discharge: 
 
Dental Appliances: None Visual Aid: None Home Medications: None Jewelry: None Clothing: Pants, Shirt, Undergarments, Slippers (meggan) Other Valuables: None PATIENT INSTRUCTIONS: 
 
After general anesthesia or intravenous sedation, for 24 hours or while taking prescription Narcotics: · Limit your activities · Do not drive and operate hazardous machinery · Do not make important personal or business decisions · Do  not drink alcoholic beverages · If you have not urinated within 8 hours after discharge, please contact your surgeon on call. Report the following to your surgeon: 
· Excessive pain, swelling, redness or odor of or around the surgical area · Temperature over 100.5 · Nausea and vomiting lasting longer than 4 hours or if unable to take medications · Any signs of decreased circulation or nerve impairment to extremity: change in color, persistent  numbness, tingling, coldness or increase pain · Any questions What to do at Home: 
Recommended activity: Activity as tolerated and no driving for today,  
 
 If you experience any of the following symptoms as above, please follow up with Dr. Kizzy García at [phone] *  Please give a list of your current medications to your Primary Care Provider. *  Please update this list whenever your medications are discontinued, doses are 
    changed, or new medications (including over-the-counter products) are added. *  Please carry medication information at all times in case of emergency situations. These are general instructions for a healthy lifestyle: No smoking/ No tobacco products/ Avoid exposure to second hand smoke Surgeon General's Warning:  Quitting smoking now greatly reduces serious risk to your health. Obesity, smoking, and sedentary lifestyle greatly increases your risk for illness A healthy diet, regular physical exercise & weight monitoring are important for maintaining a healthy lifestyle You may be retaining fluid if you have a history of heart failure or if you experience any of the following symptoms:  Weight gain of 3 pounds or more overnight or 5 pounds in a week, increased swelling in our hands or feet or shortness of breath while lying flat in bed. Please call your doctor as soon as you notice any of these symptoms; do not wait until your next office visit. Recognize signs and symptoms of STROKE: 
 
F-face looks uneven A-arms unable to move or move unevenly S-speech slurred or non-existent T-time-call 911 as soon as signs and symptoms begin-DO NOT go Back to bed or wait to see if you get better-TIME IS BRAIN. Warning Signs of HEART ATTACK Call 911 if you have these symptoms: 
? Chest discomfort. Most heart attacks involve discomfort in the center of the chest that lasts more than a few minutes, or that goes away and comes back. It can feel like uncomfortable pressure, squeezing, fullness, or pain. ? Discomfort in other areas of the upper body.  Symptoms can include pain or discomfort in one or both arms, the back, neck, jaw, or stomach. ? Shortness of breath with or without chest discomfort. ? Other signs may include breaking out in a cold sweat, nausea, or lightheadedness. Don't wait more than five minutes to call [address]! Fast action can save your life. Calling 911 is almost always the fastest way to get lifesaving treatment. Emergency Medical Services staff can begin treatment when they arrive  up to an hour sooner than if someone gets to the hospital by car. Patient armband removed and shredded The discharge information has been reviewed with the patient and spouse. The patient and spouse verbalized understanding. Discharge medications reviewed with the patient and spouse and appropriate educational materials and side effects teaching were provided. Discharge Orders None Introducing \[facility]\"" & [facility] SERVICES! [facility] introduces [employer] patient portal. Now you can access parts of your medical record, email your doctor's office, and request medication refills online. 1. In your internet browser, go to [email or web address] 2. Click on the First Time User? Click Here link in the Sign In box. You will see the New Member Sign Up page. 3. Enter your [employer] Access Code exactly as it appears below. You will not need to use this code after youve completed the sign-up process. If you do not sign up before the expiration date, you must request a new code. · [employer] Access Code: [email or web address] Expires: 12/24/2017  1:52 PM 
 
4. Enter the last four digits of your Social Security Number (xxxx) and Date of Birth (mm/dd/yyyy) as indicated and click Submit. You will be taken to the next sign-up page. 5. Create a [employer] ID. This will be your Net[employer] login ID and cannot be changed, so think of one that is secure and easy to remember. 6. Create a [employer] password. You can change your password at any time. 7. Enter your Password Reset Question and Answer. This can be used at a later time if you forget your password. 8. Enter your e-mail address. You will receive e-mail notification when new information is available in [address] 9. Click Sign Up. You can now view and download portions of your medical record. 10. Click the Download Summary menu link to download a portable copy of your medical information. If you have questions, please visit the Frequently Asked Questions section of the [employer] website. Remember, [employer] is NOT to be used for urgent needs. For medical emergencies, dial 911. Now available from your iPhone and Android! General Information Please provide this summary of care documentation to your next provider. Patient Signature:  ____________________________________________________________ Date:  ____________________________________________________________  
  
[facility] Provider Signature:  ____________________________________________________________ Date:  ____________________________________________________________

## 2017-10-19 NOTE — BRIEF OP NOTE
BRIEF OPERATIVE NOTE    Date of Procedure: 10/19/2017   Preoperative Diagnosis: VARICOSE VEINS W/PAIN & SWELLING  Postoperative Diagnosis: VARICOSE VEINS W/PAIN & SWELLING    Procedure(s):  LEFT LEG LIGATION GREATER SAPHENOUS VEIN & STAB PHLEBECTOMY x 10  Surgeon(s) and Role:     * Elizabeth Urbina MD - Primary         Assistant Staff:       Surgical Staff:  Circ-1: Gene Browne  Scrub Tech-1: Becki Bone  Surg Asst-1: Yasmani Babb  Event Time In   Incision Start 0800   Incision Close 0854     Anesthesia: General   Estimated Blood Loss: 50mL  Specimens:   ID Type Source Tests Collected by Time Destination   1 : left leg vein Preservative Leg, Left  Elizabeht Urbina MD 10/19/2017 7816 Pathology      Findings: Large saphenous vein at the junction with fairly proximal bifurcation.   Satisfactory appearance after ligation and phlebectomy   Complications: None  Implants: * No implants in log *

## 2017-10-30 ENCOUNTER — TELEPHONE (OUTPATIENT)
Dept: VASCULAR SURGERY | Age: 27
End: 2017-10-30

## 2017-10-30 NOTE — TELEPHONE ENCOUNTER
Received call from the patient stating that she had some swelling and pain over the weekend , states it is better she did use advil for the pain, and elevated, she bought a little larger compression stocking and has been wearing that , advised to elevate today and use heating pad tid today and gave her appt for 0915 tomorrow. Pt verbalized understanding.

## 2017-11-01 ENCOUNTER — APPOINTMENT (OUTPATIENT)
Dept: GENERAL RADIOLOGY | Age: 27
End: 2017-11-01
Attending: EMERGENCY MEDICINE
Payer: COMMERCIAL

## 2017-11-01 ENCOUNTER — APPOINTMENT (OUTPATIENT)
Dept: CT IMAGING | Age: 27
End: 2017-11-01
Attending: NURSE PRACTITIONER
Payer: COMMERCIAL

## 2017-11-01 ENCOUNTER — HOSPITAL ENCOUNTER (EMERGENCY)
Age: 27
Discharge: HOME | End: 2017-11-01
Attending: EMERGENCY MEDICINE
Payer: COMMERCIAL

## 2017-11-01 ENCOUNTER — OFFICE VISIT (OUTPATIENT)
Dept: VASCULAR SURGERY | Age: 27
End: 2017-11-01

## 2017-11-01 VITALS
RESPIRATION RATE: 17 BRPM | HEART RATE: 81 BPM | HEIGHT: 70 IN | OXYGEN SATURATION: 100 % | BODY MASS INDEX: 27.49 KG/M2 | SYSTOLIC BLOOD PRESSURE: 124 MMHG | TEMPERATURE: 98.4 F | DIASTOLIC BLOOD PRESSURE: 72 MMHG | WEIGHT: 192 LBS

## 2017-11-01 VITALS
DIASTOLIC BLOOD PRESSURE: 70 MMHG | BODY MASS INDEX: 28.06 KG/M2 | HEART RATE: 74 BPM | RESPIRATION RATE: 18 BRPM | WEIGHT: 196 LBS | SYSTOLIC BLOOD PRESSURE: 110 MMHG | HEIGHT: 70 IN

## 2017-11-01 DIAGNOSIS — M79.89 LEG SWELLING: ICD-10-CM

## 2017-11-01 DIAGNOSIS — R06.02 SOB (SHORTNESS OF BREATH): ICD-10-CM

## 2017-11-01 DIAGNOSIS — I83.819 VARICOSE VEINS WITH PAIN: Primary | ICD-10-CM

## 2017-11-01 DIAGNOSIS — I82.412 ACUTE DEEP VEIN THROMBOSIS (DVT) OF FEMORAL VEIN OF LEFT LOWER EXTREMITY (HCC): Primary | ICD-10-CM

## 2017-11-01 LAB
ALBUMIN SERPL-MCNC: 3.9 G/DL (ref 3.4–5)
ALBUMIN/GLOB SERPL: 0.9 {RATIO} (ref 0.8–1.7)
ALP SERPL-CCNC: 76 U/L (ref 45–117)
ALT SERPL-CCNC: 16 U/L (ref 13–56)
ANION GAP SERPL CALC-SCNC: 10 MMOL/L (ref 3–18)
APPEARANCE UR: CLEAR
AST SERPL-CCNC: 14 U/L (ref 15–37)
BACTERIA URNS QL MICRO: ABNORMAL /HPF
BASOPHILS # BLD: 0 K/UL (ref 0–0.06)
BASOPHILS NFR BLD: 1 % (ref 0–2)
BILIRUB SERPL-MCNC: 1.1 MG/DL (ref 0.2–1)
BILIRUB UR QL: NEGATIVE
BUN SERPL-MCNC: 15 MG/DL (ref 7–18)
BUN/CREAT SERPL: 18 (ref 12–20)
CALCIUM SERPL-MCNC: 9.4 MG/DL (ref 8.5–10.1)
CHLORIDE SERPL-SCNC: 104 MMOL/L (ref 100–108)
CO2 SERPL-SCNC: 27 MMOL/L (ref 21–32)
COLOR UR: YELLOW
CREAT SERPL-MCNC: 0.84 MG/DL (ref 0.6–1.3)
D DIMER PPP FEU-MCNC: 2.37 UG/ML(FEU)
DIFFERENTIAL METHOD BLD: ABNORMAL
EOSINOPHIL # BLD: 0.2 K/UL (ref 0–0.4)
EOSINOPHIL NFR BLD: 3 % (ref 0–5)
EPITH CASTS URNS QL MICRO: ABNORMAL /LPF (ref 0–5)
ERYTHROCYTE [DISTWIDTH] IN BLOOD BY AUTOMATED COUNT: 15.2 % (ref 11.6–14.5)
GLOBULIN SER CALC-MCNC: 4.4 G/DL (ref 2–4)
GLUCOSE SERPL-MCNC: 92 MG/DL (ref 74–99)
GLUCOSE UR STRIP.AUTO-MCNC: NEGATIVE MG/DL
HCG UR QL: NEGATIVE
HCT VFR BLD AUTO: 35.3 % (ref 35–45)
HGB BLD-MCNC: 12 G/DL (ref 12–16)
HGB UR QL STRIP: NEGATIVE
INR PPP: 1 (ref 0.8–1.2)
KETONES UR QL STRIP.AUTO: NEGATIVE MG/DL
LEUKOCYTE ESTERASE UR QL STRIP.AUTO: ABNORMAL
LYMPHOCYTES # BLD: 2.1 K/UL (ref 0.9–3.6)
LYMPHOCYTES NFR BLD: 25 % (ref 21–52)
MCH RBC QN AUTO: 28.2 PG (ref 24–34)
MCHC RBC AUTO-ENTMCNC: 34 G/DL (ref 31–37)
MCV RBC AUTO: 83.1 FL (ref 74–97)
MONOCYTES # BLD: 0.5 K/UL (ref 0.05–1.2)
MONOCYTES NFR BLD: 5 % (ref 3–10)
NEUTS SEG # BLD: 5.7 K/UL (ref 1.8–8)
NEUTS SEG NFR BLD: 66 % (ref 40–73)
NITRITE UR QL STRIP.AUTO: NEGATIVE
PH UR STRIP: 5 [PH] (ref 5–8)
PLATELET # BLD AUTO: 275 K/UL (ref 135–420)
PMV BLD AUTO: 8.6 FL (ref 9.2–11.8)
POTASSIUM SERPL-SCNC: 3.9 MMOL/L (ref 3.5–5.5)
PROT SERPL-MCNC: 8.3 G/DL (ref 6.4–8.2)
PROT UR STRIP-MCNC: NEGATIVE MG/DL
PROTHROMBIN TIME: 12.7 SEC (ref 11.5–15.2)
RBC # BLD AUTO: 4.25 M/UL (ref 4.2–5.3)
RBC #/AREA URNS HPF: ABNORMAL /HPF (ref 0–5)
SODIUM SERPL-SCNC: 141 MMOL/L (ref 136–145)
SP GR UR REFRACTOMETRY: 1.01 (ref 1–1.03)
UROBILINOGEN UR QL STRIP.AUTO: 0.2 EU/DL (ref 0.2–1)
WBC # BLD AUTO: 8.6 K/UL (ref 4.6–13.2)
WBC URNS QL MICRO: ABNORMAL /HPF (ref 0–5)

## 2017-11-01 PROCEDURE — 96360 HYDRATION IV INFUSION INIT: CPT

## 2017-11-01 PROCEDURE — 85025 COMPLETE CBC W/AUTO DIFF WBC: CPT | Performed by: NURSE PRACTITIONER

## 2017-11-01 PROCEDURE — 81025 URINE PREGNANCY TEST: CPT

## 2017-11-01 PROCEDURE — 74011250637 HC RX REV CODE- 250/637: Performed by: NURSE PRACTITIONER

## 2017-11-01 PROCEDURE — 71275 CT ANGIOGRAPHY CHEST: CPT

## 2017-11-01 PROCEDURE — 96372 THER/PROPH/DIAG INJ SC/IM: CPT

## 2017-11-01 PROCEDURE — 80053 COMPREHEN METABOLIC PANEL: CPT | Performed by: NURSE PRACTITIONER

## 2017-11-01 PROCEDURE — 85610 PROTHROMBIN TIME: CPT | Performed by: NURSE PRACTITIONER

## 2017-11-01 PROCEDURE — 74011250636 HC RX REV CODE- 250/636: Performed by: NURSE PRACTITIONER

## 2017-11-01 PROCEDURE — 81001 URINALYSIS AUTO W/SCOPE: CPT | Performed by: NURSE PRACTITIONER

## 2017-11-01 PROCEDURE — 74011636320 HC RX REV CODE- 636/320: Performed by: EMERGENCY MEDICINE

## 2017-11-01 PROCEDURE — 71020 XR CHEST PA LAT: CPT

## 2017-11-01 PROCEDURE — 85379 FIBRIN DEGRADATION QUANT: CPT | Performed by: NURSE PRACTITIONER

## 2017-11-01 PROCEDURE — 87086 URINE CULTURE/COLONY COUNT: CPT | Performed by: NURSE PRACTITIONER

## 2017-11-01 PROCEDURE — 93971 EXTREMITY STUDY: CPT

## 2017-11-01 PROCEDURE — 99285 EMERGENCY DEPT VISIT HI MDM: CPT

## 2017-11-01 RX ORDER — ENOXAPARIN SODIUM 100 MG/ML
1 INJECTION SUBCUTANEOUS
Status: COMPLETED | OUTPATIENT
Start: 2017-11-01 | End: 2017-11-01

## 2017-11-01 RX ORDER — SODIUM CHLORIDE 9 MG/ML
1000 INJECTION, SOLUTION INTRAVENOUS CONTINUOUS
Status: DISCONTINUED | OUTPATIENT
Start: 2017-11-01 | End: 2017-11-01 | Stop reason: HOSPADM

## 2017-11-01 RX ORDER — ENOXAPARIN SODIUM 150 MG/ML
1.5 INJECTION SUBCUTANEOUS
Status: DISCONTINUED | OUTPATIENT
Start: 2017-11-01 | End: 2017-11-01

## 2017-11-01 RX ADMIN — SODIUM CHLORIDE 1000 ML: 900 INJECTION, SOLUTION INTRAVENOUS at 15:35

## 2017-11-01 RX ADMIN — ENOXAPARIN SODIUM 90 MG: 100 INJECTION SUBCUTANEOUS at 19:04

## 2017-11-01 RX ADMIN — IOPAMIDOL 100 ML: 755 INJECTION, SOLUTION INTRAVENOUS at 16:49

## 2017-11-01 RX ADMIN — RIVAROXABAN 15 MG: 15 TABLET, FILM COATED ORAL at 19:07

## 2017-11-01 NOTE — ED TRIAGE NOTES
Patient had vein stripping done on 10/19/17. Patient started with dyspnea on exertion on 10/21/17. Patient mentioned this to Dr. Shayy Arriaga who sent patient here for further evaluation. Sepsis Screening completed    (  )Patient meets SIRS criteria. (x  )Patient does not meet SIRS criteria.       SIRS Criteria is achieved when two or more of the following are present   Temperature < 96.8°F (36°C) or > 100.9°F (38.3°C)   Heart Rate > 90 beats per minute   Respiratory Rate > 20 breaths per minute   WBC count > 12,000 or <4,000 or > 10% bands

## 2017-11-01 NOTE — ED NOTES
Pt returned from CT with CT tech - per CT tech, pt c/o pain at PIV site and CT tech removed IV and started new PIV site. Pt family member at bedside - pt reconnected to monitor equipment - pt denies additional needs at this time.

## 2017-11-01 NOTE — PROCEDURES
[facility]  *** FINAL REPORT ***    Name: Edyta Pittman  MRN: [id number]    Outpatient  : 10 Jul 1990  HIS Order #: [id number]  [id number] [facility] Visit #: [id number]  Date: 2017    TYPE OF TEST: Peripheral Venous Testing    REASON FOR TEST  Pain in limb, Limb swelling    Left Leg:-  Deep venous thrombosis:           Yes  Proximal extent of thrombus:      Common Femoral  Superficial venous thrombosis:    Yes  Deep venous insufficiency:        Not examined  Superficial venous insufficiency: Not examined      INTERPRETATION/FINDINGS  Duplex images were obtained using 2-D gray scale, color flow, and  spectral Doppler analysis. Left leg :  1. Deep vein(s) visualized include the common femoral, deep femoral,  proximal femoral, mid femoral, distal femoral, popliteal(above knee),  popliteal(fossa), popliteal(below knee), posterior tibial and peroneal   veins. 2. Deep venous thrombosis identified in the common femoral, proximal  femoral, mid femoral, distal femoral, popliteal(above knee),  popliteal(fossa), popliteal(below knee), posterior tibial, peroneal,  gastrocnemius and deep femoral veins. 3. Thrombus appears acute. 4. No evidence of deep vein thrombosis in the contralateral common  femoral vein. 5. Superficial vein(s) visualized include the great saphenous vein. 6. Superficial venous thrombosis identified in the sapheno-femoral  junction (post closure)    ADDITIONAL COMMENTS  Verbal report given to [facility]    I have personally reviewed the data relevant to the interpretation of  this  study. TECHNOLOGIST: Celine Peterson  Signed: 2017 03:27 PM    PHYSICIAN: Linford Siemens.  Pavan Farrar MD  Signed: 2017 10:40 AM

## 2017-11-01 NOTE — ED PROVIDER NOTES
[address]  EMERGENCY DEPARTMENT HISTORY AND PHYSICAL EXAM       Date: 11/1/2017   Patient Name: Portia Hess   YOB: 1990  Medical Record Number: [id number]    History of Presenting Illness     Chief Complaint   Patient presents with    Shortness of Breath        History Provided By:  patient    Additional History: 2:23 PM   Portia Hess is a 28 y.o. female with recent varicose surgery on 10/19/2017 (2 weeks ago) who presents to the emergency department C/O SOB starting 3 days after her surgery. Associated sxs include left lower leg swelling starting 4 days ago. Pt has spoken to her surgeon, Miguel Peñaloza MD, who instructed her to come to the ED for evaluation of possible blood clot. Denies fhx of blood clots. Denies fever, chills, cough, and any other sxs or complaints. Primary Care Provider: None   Specialist:    Past History     Past Medical History:   Past Medical History:   Diagnosis Date    Asthma     as a child    Carpal tunnel syndrome     bilateral    Psychiatric disorder     depression    Thyroid disease     hypothyroidism-takes natural suppliments only        Past Surgical History:   Past Surgical History:   Procedure Laterality Date    HX HEENT      wisdom teeth        Family History:   Family History   Problem Relation Age of Onset    Other Mother      neurological disorder, varicose veins    Diabetes Father      type 2    Cancer Maternal Grandmother      stage four lung, liver, kidneys and a tumor at base of skull.  Cancer Maternal Grandfather      colon cancer    COPD Maternal Grandfather     Alzheimer Paternal Grandmother         Social History:   Social History   Substance Use Topics    Smoking status: Never Smoker    Smokeless tobacco: Never Used    Alcohol use Yes      Comment: type of drink varies but about 1 drink per week. Allergies:    Allergies   Allergen Reactions    Bee Venom Protein (Honey Bee) Anaphylaxis Review of Systems   Review of Systems   Constitutional: Negative for chills and fever. Respiratory: Positive for shortness of breath. Negative for cough. Cardiovascular: Positive for leg swelling (left). All other systems reviewed and are negative. Physical Exam  Vitals:    11/01/17 1730 11/01/17 1800 11/01/17 1830 11/01/17 1910   BP: 117/71 122/72 122/73 124/72   Pulse: 74 87 83 81   Resp: 14 15 18 17   Temp:       SpO2: 100% 96% 100% 100%   Weight:       Height:           Physical Exam   Constitutional: She is oriented to person, place, and time. She appears well-developed and well-nourished. HENT:   Head: Normocephalic and atraumatic. Eyes: Conjunctivae are normal.   Neck: Normal range of motion. Cardiovascular: Normal rate and regular rhythm. No murmur heard. Pulmonary/Chest: Effort normal and breath sounds normal. No respiratory distress. She has no wheezes. Abdominal: Soft. Bowel sounds are normal. There is no tenderness. Musculoskeletal: Normal range of motion. Legs:  Neurological: She is alert and oriented to person, place, and time. Skin: Skin is warm and dry. Nursing note and vitals reviewed. Impression: Will obtain blasic blood work as well as PT/INR in the case patient has blood clot. Will PVL lower extremity for DVT as well as obtain Ddimer to evaluate for PE.    Diagnostic Study Results     Labs -      Recent Results (from the past 12 hour(s))   URINALYSIS W/ RFLX MICROSCOPIC    Collection Time: 11/01/17  2:30 PM   Result Value Ref Range    Color YELLOW      Appearance CLEAR      Specific gravity 1.015 1.005 - 1.030      pH (UA) 5.0 5.0 - 8.0      Protein NEGATIVE  NEG mg/dL    Glucose NEGATIVE  NEG mg/dL    Ketone NEGATIVE  NEG mg/dL    Bilirubin NEGATIVE  NEG      Blood NEGATIVE  NEG      Urobilinogen 0.2 0.2 - 1.0 EU/dL    Nitrites NEGATIVE  NEG      Leukocyte Esterase MODERATE (A) NEG     URINE MICROSCOPIC ONLY    Collection Time: 11/01/17  2:30 PM Result Value Ref Range    WBC 3 to 5 0 - 5 /hpf    RBC 0 to 1 0 - 5 /hpf    Epithelial cells FEW 0 - 5 /lpf    Bacteria 1+ (A) NEG /hpf   CBC WITH AUTOMATED DIFF    Collection Time: 11/01/17  2:35 PM   Result Value Ref Range    WBC 8.6 4.6 - 13.2 K/uL    RBC 4.25 4.20 - 5.30 M/uL    HGB 12.0 12.0 - 16.0 g/dL    HCT 35.3 35.0 - 45.0 %    MCV 83.1 74.0 - 97.0 FL    MCH 28.2 24.0 - 34.0 PG    MCHC 34.0 31.0 - 37.0 g/dL    RDW 15.2 (H) 11.6 - 14.5 %    PLATELET 450 978 - 294 K/uL    MPV 8.6 (L) 9.2 - 11.8 FL    NEUTROPHILS 66 40 - 73 %    LYMPHOCYTES 25 21 - 52 %    MONOCYTES 5 3 - 10 %    EOSINOPHILS 3 0 - 5 %    BASOPHILS 1 0 - 2 %    ABS. NEUTROPHILS 5.7 1.8 - 8.0 K/UL    ABS. LYMPHOCYTES 2.1 0.9 - 3.6 K/UL    ABS. MONOCYTES 0.5 0.05 - 1.2 K/UL    ABS. EOSINOPHILS 0.2 0.0 - 0.4 K/UL    ABS. BASOPHILS 0.0 0.0 - 0.06 K/UL    DF AUTOMATED     METABOLIC PANEL, COMPREHENSIVE    Collection Time: 11/01/17  2:35 PM   Result Value Ref Range    Sodium 141 136 - 145 mmol/L    Potassium 3.9 3.5 - 5.5 mmol/L    Chloride 104 100 - 108 mmol/L    CO2 27 21 - 32 mmol/L    Anion gap 10 3.0 - 18 mmol/L    Glucose 92 74 - 99 mg/dL    BUN 15 7.0 - 18 MG/DL    Creatinine 0.84 0.6 - 1.3 MG/DL    BUN/Creatinine ratio 18 12 - 20      GFR est AA >60 >60 ml/min/1.73m2    GFR est non-AA >60 >60 ml/min/1.73m2    Calcium 9.4 8.5 - 10.1 MG/DL    Bilirubin, total 1.1 (H) 0.2 - 1.0 MG/DL    ALT (SGPT) 16 13 - 56 U/L    AST (SGOT) 14 (L) 15 - 37 U/L    Alk.  phosphatase 76 45 - 117 U/L    Protein, total 8.3 (H) 6.4 - 8.2 g/dL    Albumin 3.9 3.4 - 5.0 g/dL    Globulin 4.4 (H) 2.0 - 4.0 g/dL    A-G Ratio 0.9 0.8 - 1.7     D DIMER    Collection Time: 11/01/17  2:35 PM   Result Value Ref Range    D DIMER 2.37 (H) <0.46 ug/ml(FEU)   PROTHROMBIN TIME + INR    Collection Time: 11/01/17  2:35 PM   Result Value Ref Range    Prothrombin time 12.7 11.5 - 15.2 sec    INR 1.0 0.8 - 1.2     HCG URINE, QL. - POC    Collection Time: 11/01/17  3:39 PM   Result Value Ref Range    Pregnancy test,urine (POC) NEGATIVE  NEG         Radiologic Studies -  The following have been ordered and reviewed:  CTA CHEST W OR W WO CONT   Final Result   IMPRESSION:     1. Suboptimal exam due to timing of contrast bolus with preferential  opacification of the aortic/systemic vasculature. While no obvious pulmonary  arterial filling defects are identified, subtle PE cannot be entirely excluded. If high clinical concern persists, recommend follow-up VQ scan.     2.  Otherwise unremarkable examination without acute cardiopulmonary  Abnormality. As read by the radiologist.    Kaur Duarte LOWER EXT VENOUS LEFT   INTERPRETATION/FINDINGS  Duplex images were obtained using 2-D gray scale, color flow, and  spectral Doppler analysis. Left leg :  1. Deep vein(s) visualized include the common femoral, deep femoral,  proximal femoral, mid femoral, distal femoral, popliteal(above knee),  popliteal(fossa), popliteal(below knee), posterior tibial and peroneal   veins. 2. Deep venous thrombosis identified in the common femoral, proximal  femoral, mid femoral, distal femoral, popliteal(above knee),  popliteal(fossa), popliteal(below knee), posterior tibial, peroneal,  gastrocnemius and deep femoral veins. 3. Thrombus appears acute. 4. No evidence of deep vein thrombosis in the contralateral common  femoral vein. 5. Superficial vein(s) visualized include the great saphenous vein. 6. Superficial venous thrombosis identified in the sapheno-femoral  junction (post closure)   As read by the technologist   XR CHEST PA LAT   Final Result   IMPRESSION:     No active disease    As read by the radiologist.         Medical Decision Making   I am the first provider for this patient. I reviewed the vital signs, available nursing notes, past medical history, past surgical history, family history and social history. Vital Signs-Reviewed the patient's vital signs.    Patient Vitals for the past 12 hrs:   Temp Pulse Resp BP SpO2   11/01/17 1910 - 81 17 124/72 100 %   11/01/17 1830 - 83 18 122/73 100 %   11/01/17 1800 - 87 15 122/72 96 %   11/01/17 1730 - 74 14 117/71 100 %   11/01/17 1719 - 87 18 102/69 100 %   11/01/17 1630 - 70 14 120/77 100 %   11/01/17 1600 - 70 16 119/76 100 %   11/01/17 1538 - 63 16 104/87 100 %   11/01/17 1345 98.4 °F (36.9 °C) 92 20 118/72 100 %       Pulse Oximetry Analysis - Normal 100% on room air     Old Medical Records: Old medical records. Nursing notes. Procedures:   Procedures    ED Course:  2:23 PM  Initial assessment performed. The patients presenting problems have been discussed, and they are in agreement with the care plan formulated and outlined with them. I have encouraged them to ask questions as they arise throughout their visit. 3:49 PM Update on blood clot in leg and need to do CTA. Pt showing no signs of distress. Patient is offering no questions or concerns at this time. 4:11 PM Sandra Spicer MD, vascular surgeon, called for an update on the patient. He was able to view the PVL of the extremity. He was updated on blood clot via PVL. We are waiting on CTA results. Sandra Spicer MD requested to be called and states pt is likely stable for outpatient management. Will update him on CTA results. 6:29 PM Patient's CTA results are back and I updated Sandra Spicer MD on the results. He recommends starting the patient on Lovenox and Xarelto now. Will have the patient follow up with Sandra Spicer MD for long-term monitoring of coagulation. 6:33 PM Dicussed results and conversation with. denis will give sub cutaneous Lovenox and start Xarelto. Patient understands she will need to see Sandra Spicer MD first thing in the morning for coupon Pt understands cautions for bleeding and reasons to return to ED. Offers no questions or concerns at this time.      Medications Given in the ED:  Medications   0.9% sodium chloride infusion 1,000 mL (0 mL IntraVENous IV Completed 11/1/17 7726) iopamidol (ISOVUE-370) 76 % injection 100 mL (100 mL IntraVENous Given 11/1/17 1649)   rivaroxaban (XARELTO) tablet 15 mg (15 mg Oral Given 11/1/17 1907)   enoxaparin (LOVENOX) injection 90 mg (90 mg SubCUTAneous Given 11/1/17 1904)       Discharge Note:  6:41 PM   Pt has been reexamined. Patient has no new complaints, changes, or physical findings. Care plan outlined and precautions discussed. Results were reviewed with the patient. All medications were reviewed with the patient; will d/c home with Xarelto. All of pt's questions and concerns were addressed. Patient was instructed and agrees to follow up with Letha Martin MD , as well as to return to the ED upon further deterioration. Patient is ready to go home. Diagnosis   Clinical Impression:   1. Acute deep vein thrombosis (DVT) of femoral vein of left lower extremity (HCC)    2. SOB (shortness of breath)         Follow-up Information     Follow up With Details Comments Contact Info    Raúl Ramos MD Go in 1 day For vascular follow up and coupon for [address]  [address] [facility]      [facility] EMERGENCY DEPT  As needed, If symptoms worsen [address] [id number]  [phone]          Discharge Medication List as of 11/1/2017  6:42 PM      START taking these medications    Details   rivaroxaban (XARELTO) 15 mg tab tablet Take 1 Tab by mouth two (2) times a day for 21 days. , Normal, Disp-42 Tab, R-0         CONTINUE these medications which have NOT CHANGED    Details   oxyCODONE-acetaminophen (PERCOCET) 5-325 mg per tablet Take 2 Tabs by mouth every six (6) hours as needed for Pain. Max Daily Amount: 8 Tabs., Print, Disp-42 Tab, R-0      !! OTHER 2 Caps. Natural thyroid, Historical Med      !! OTHER 1 Drop. Vitamin K, Historical Med      MAGNESIUM PO Take 1 Drop by mouth daily. , Historical Med      CHOLECALCIFEROL, VITAMIN D3, (VITAMIN D3 PO) Take  by mouth., Historical Med      !! OTHER Historical Med       !! - Potential duplicate medications found. Please discuss with provider.          _______________________________   Attestations: This note is prepared by Mray Santiago, acting as a Scribe for [employer], NP on 2:21 PM on 11/1/2017 . RAIZA Zacarias: The scribe's documentation has been prepared under my direction and personally reviewed by me in its entirety.   _______________________________

## 2017-11-01 NOTE — DISCHARGE INSTRUCTIONS
Shortness of Breath: Care Instructions  Your Care Instructions  Shortness of breath has many causes. Sometimes conditions such as anxiety can lead to shortness of breath. Some people get mild shortness of breath when they exercise. Trouble breathing also can be a symptom of a serious problem, such as asthma, lung disease, emphysema, heart problems, and pneumonia. If your shortness of breath continues, you may need tests and treatment. Watch for any changes in your breathing and other symptoms. Follow-up care is a key part of your treatment and safety. Be sure to make and go to all appointments, and call your doctor if you are having problems. It's also a good idea to know your test results and keep a list of the medicines you take. How can you care for yourself at home? · Do not smoke or allow others to smoke around you. If you need help quitting, talk to your doctor about stop-smoking programs and medicines. These can increase your chances of quitting for good. · Get plenty of rest and sleep. · Take your medicines exactly as prescribed. Call your doctor if you think you are having a problem with your medicine. · Find healthy ways to deal with stress. ¨ Exercise daily. ¨ Get plenty of sleep. ¨ Eat regularly and well. When should you call for help? Call 911 anytime you think you may need emergency care. For example, call if:  ? · You have severe shortness of breath. ? · You have symptoms of a heart attack. These may include:  ¨ Chest pain or pressure, or a strange feeling in the chest.  ¨ Sweating. ¨ Shortness of breath. ¨ Nausea or vomiting. ¨ Pain, pressure, or a strange feeling in the back, neck, jaw, or upper belly or in one or both shoulders or arms. ¨ Lightheadedness or sudden weakness. ¨ A fast or irregular heartbeat. After you call 911, the  may tell you to chew 1 adult-strength or 2 to 4 low-dose aspirin. Wait for an ambulance. Do not try to drive yourself.    ?Call your doctor now or seek immediate medical care if:  ? · Your shortness of breath gets worse or you start to wheeze. Wheezing is a high-pitched sound when you breathe. ? · You wake up at night out of breath or have to prop your head up on several pillows to breathe. ? · You are short of breath after only light activity or while at rest.   ? Watch closely for changes in your health, and be sure to contact your doctor if:  ? · You do not get better over the next 1 to 2 days. Where can you learn more? Go to http://jagjit-michelle.info/. Enter S780 in the search box to learn more about \"Shortness of Breath: Care Instructions. \"  Current as of: May 12, 2017  Content Version: 11.4  © 4003-8349 [employer]. Care instructions adapted under license by [employer] (which disclaims liability or warranty for this information). If you have questions about a medical condition or this instruction, always ask your healthcare professional. [place] [address] any warranty or liability for your use of this information. Deep Vein Thrombosis: Care Instructions  Your Care Instructions    A deep vein thrombosis (DVT) is a blood clot in certain veins of the legs, pelvis, or arms. Blood clots in these veins need to be treated because they can get bigger, break loose, and travel through the bloodstream to the lungs. A blood clot in a lung can be life-threatening. The doctor may have given you a blood thinner (anticoagulant). A blood thinner can stop the blood clot from growing larger and prevent new clots from forming. You will need to take a blood thinner for 3 to 6 months or longer. The doctor has checked you carefully, but problems can develop later. If you notice any problems or new symptoms, get medical treatment right away. Follow-up care is a key part of your treatment and safety.  Be sure to make and go to all appointments, and call your doctor if you are having problems. It's also a good idea to know your test results and keep a list of the medicines you take. How can you care for yourself at home? · Take your medicines exactly as prescribed. Call your doctor if you think you are having a problem with your medicine. · If you are taking a blood thinner, be sure you get instructions about how to take your medicine safely. Blood thinners can cause serious bleeding problems. · Wear compression stockings if your doctor recommends them. These stockings are tighter at the feet than on the legs. They may reduce pain and swelling in your legs. But there are different types of stockings, and they need to fit right. So your doctor will recommend what you need. · When you sit, use a pillow to raise the arm or leg that has the blood clot. Try to keep it above the level of your heart. When should you call for help? Call 911 anytime you think you may need emergency care. For example, call if:  ? · You passed out (lost consciousness). ? · You have symptoms of a blood clot in your lung (called a pulmonary embolism). These include:  ¨ Sudden chest pain. ¨ Trouble breathing. ¨ Coughing up blood. ?Call your doctor now or seek immediate medical care if:  ? · You have new or worse trouble breathing. ? · You are dizzy or lightheaded, or you feel like you may faint. ? · You have symptoms of a blood clot in your arm or leg. These may include:  ¨ Pain in the arm, calf, back of the knee, thigh, or groin. ¨ Redness and swelling in the arm, leg, or groin. ? Watch closely for changes in your health, and be sure to contact your doctor if:  ? · You do not get better as expected. Where can you learn more? Go to http://jagjit-michelle.info/. Enter L278 in the search box to learn more about \"Deep Vein Thrombosis: Care Instructions. \"  Current as of: March 20, 2017  Content Version: 11.4  © 1991-7056 [employer].  Care instructions adapted under license by [employer] (which disclaims liability or warranty for this information). If you have questions about a medical condition or this instruction, always ask your healthcare professional. [address] any warranty or liability for your use of this information.

## 2017-11-01 NOTE — ED NOTES
Forms printed, MD to complete & sign. Completed form will be faxed to [facility] @ [phone] & FMLA mailed to [place] in [place]. Forms will be faxed to Forms Completion when done. Verbal and written d/c instructions reviewed with pt; instructions, prescription, home care, and follow-up reviewed. Pt verbalized an understanding of d/c instructions and prescription usage. Pt in NAD at time of d/c. Pt with even, unlabored respirations at time of d/c. Patient armband removed and shredded.

## 2017-11-02 ENCOUNTER — TELEPHONE (OUTPATIENT)
Dept: VASCULAR SURGERY | Age: 27
End: 2017-11-02

## 2017-11-02 DIAGNOSIS — M79.89 LEG SWELLING: Primary | ICD-10-CM

## 2017-11-02 NOTE — TELEPHONE ENCOUNTER
Patient came by the office after being sent to the ed for possible DVT and PE, patient was positive for DVT and placed on xarelto. Advised the patient to start the starter pack today taking 15 mg  Bid for 21 days and then moving to 20 mg daily. Gave patient coupon for medication to use at the pharmacy. Pt will continue to use the heating pad and use tylenol extra strength for pain. Advised would need ultra sound in 1 month for reevaluation of the clot. Pt verbalized understanding. Patient is to wait for 1 week for stocking use.      Type of procedure: left  leg venous     Weight in appropriate:yes    Name of pre scriber: Dr. Lorrie Calvert    Date and time order was received: 21  11/02/2017

## 2017-11-03 LAB
BACTERIA SPEC CULT: ABNORMAL
BACTERIA SPEC CULT: ABNORMAL
SERVICE CMNT-IMP: ABNORMAL

## 2017-11-04 NOTE — PROGRESS NOTES
[address]    Chief Complaint   Patient presents with   Larisa Oliva Varicose Veins       History and Physical    Ms. Marjorie Devries returns to our office after undergoing a left saphenous vein ligation and stab phlebectomies. Ms. Marjorie Devries states that since surgery she has been having some shortness of breath with walking and developed severe left leg swelling this weekend. She states her pain was severe at that time, but since then her pain has improved as well as her leg swelling. She denies chest pain or leg pain, but states her knee feels swollen and this affects her ability to straighten her knee. Past Medical History:   Diagnosis Date    Asthma     as a child    Carpal tunnel syndrome     bilateral    Psychiatric disorder     depression    Thyroid disease     hypothyroidism-takes natural suppliments only     Patient Active Problem List   Diagnosis Code    Varicose veins with pain I83.819    Leg swelling M79.89     Past Surgical History:   Procedure Laterality Date    HX HEENT      wisdom teeth     Current Outpatient Prescriptions   Medication Sig Dispense Refill    oxyCODONE-acetaminophen (PERCOCET) 5-325 mg per tablet Take 2 Tabs by mouth every six (6) hours as needed for Pain. Max Daily Amount: 8 Tabs. 42 Tab 0    OTHER 2 Caps. Natural thyroid      OTHER 1 Drop. Vitamin K      MAGNESIUM PO Take 1 Drop by mouth daily.  CHOLECALCIFEROL, VITAMIN D3, (VITAMIN D3 PO) Take  by mouth.  OTHER       rivaroxaban (XARELTO) 15 mg tab tablet Take 1 Tab by mouth two (2) times a day for 21 days. 42 Tab 0     Allergies   Allergen Reactions    Bee Venom Protein (Honey Bee) Anaphylaxis     Social History     Social History    Marital status:      Spouse name: N/A    Number of children: N/A    Years of education: N/A     Occupational History    Not on file.      Social History Main Topics    Smoking status: Never Smoker    Smokeless tobacco: Never Used    Alcohol use Yes      Comment: type of drink varies but about 1 drink per week.  Drug use: No    Sexual activity: Yes     Other Topics Concern    Not on file     Social History Narrative      Family History   Problem Relation Age of Onset    Other Mother      neurological disorder, varicose veins    Diabetes Father      type 2    Cancer Maternal Grandmother      stage four lung, liver, kidneys and a tumor at base of skull.  Cancer Maternal Grandfather      colon cancer    COPD Maternal Grandfather     Alzheimer Paternal Grandmother        Review of Systems    Review of Systems   Constitutional: Negative for chills, diaphoresis, fever, malaise/fatigue and weight loss. HENT: Negative for hearing loss and sore throat. Eyes: Negative for blurred vision, photophobia and redness. Respiratory: Positive for shortness of breath. Negative for cough, hemoptysis and wheezing. Cardiovascular: Positive for leg swelling. Negative for chest pain, palpitations and orthopnea. Gastrointestinal: Negative for abdominal pain, blood in stool, constipation, diarrhea, heartburn, nausea and vomiting. Genitourinary: Negative for dysuria, frequency, hematuria and urgency. Musculoskeletal: Negative for back pain and myalgias. Skin: Negative for itching and rash. Neurological: Negative for dizziness, speech change, focal weakness, weakness and headaches. Endo/Heme/Allergies: Does not bruise/bleed easily. Psychiatric/Behavioral: Negative for depression and suicidal ideas.             Physical Exam:    Visit Vitals    /70 (BP 1 Location: Left arm, BP Patient Position: Sitting)    Pulse 74    Resp 18    Ht 5' 9.5\" (1.765 m)    Wt 196 lb (88.9 kg)    LMP 10/05/2017    BMI 28.53 kg/m2      Physical Examination: General appearance - alert, well appearing, and in no distress  Mental status - alert, oriented to person, place, and time  Eyes - sclera anicteric, left eye normal, right eye normal  Ears - right ear normal, left ear normal  Nose - normal and patent, no erythema, discharge or polyps  Mouth - mucous membranes moist, pharynx normal without lesions  Neck - supple, no significant adenopathy  Lymphatics - no palpable lymphadenopathy  Chest - clear to auscultation, no wheezes, rales or rhonchi, symmetric air entry  Heart - normal rate and regular rhythm  Extremities - 1+ edema left LE. Incisions healing well. No fluid noted behind the knee. Impression and Plan:    ICD-10-CM ICD-9-CM    1. Varicose veins with pain I83.819 454.8    2. Leg swelling M79.89 729.81      I told Ms. Gerhardt Poche that externally she looks fine. This complaint of dyspnea, however, is concerning especially in conjunction with leg swelling after a vein procedure. I will send her to the ER for a CTA of the chest and a DVT study. Ms. Gerhardt Poche agrees to this plan and will head to the ER immediately. The treatment plan was reviewed with the patient in detail. The patient voiced understanding of this plan and all questions and concerns were addressed. The patient agrees with this plan. We discussed the signs and symptoms that would require earlier attention or intervention. The patient was given educational material related to his/her visit and the patient has voiced understanding of the material.     I appreciate the opportunity to participate in the care of your patient. I will be sure to keep you informed of any subsequent changes in the treatment plan. If you have any questions or concerns, please feel free to contact me. Raissa Clark MD    PLEASE NOTE:  This document has been produced using voice recognition software. Unrecognized errors in transcription may be present.

## 2017-12-05 ENCOUNTER — HOSPITAL ENCOUNTER (OUTPATIENT)
Dept: VASCULAR SURGERY | Age: 27
Discharge: HOME | End: 2017-12-05
Attending: SURGERY
Payer: COMMERCIAL

## 2017-12-05 DIAGNOSIS — M79.89 LEG SWELLING: Primary | ICD-10-CM

## 2017-12-05 DIAGNOSIS — M79.89 LEG SWELLING: ICD-10-CM

## 2017-12-05 PROCEDURE — 93971 EXTREMITY STUDY: CPT

## 2017-12-05 NOTE — TELEPHONE ENCOUNTER
Patient called requesting refill. on her xarelto 20 mg 1 tablet daily #30/4 Refill authorized by Dr. Chavo Chi.

## 2017-12-05 NOTE — PROCEDURES
[facility]  *** FINAL REPORT ***    Name: Katie Reno  MRN: [id number]    Outpatient  : 10 Jul 1990  HIS Order #: [id number]  [address] [facility] Visit #: [id number]  Date: 05 Dec 2017    TYPE OF TEST: Peripheral Venous Testing    REASON FOR TEST  Limb swelling    Left Leg:-  Deep venous thrombosis:           Yes  Proximal extent of thrombus:      Common Femoral  Superficial venous thrombosis:    No  Deep venous insufficiency:        Not examined  Superficial venous insufficiency: Not examined      INTERPRETATION/FINDINGS  Duplex images were obtained using 2-D gray scale, color flow, and  spectral Doppler analysis. Left leg :  1. Deep vein(s) visualized include the common femoral, deep femoral,  proximal femoral, mid femoral, distal femoral, popliteal(above knee),  popliteal(fossa), posterior tibial and peroneal veins. 2. Deep venous thrombosis identified in the common femoral, proximal  femoral, mid femoral, distal femoral, popliteal(above knee),  popliteal(fossa) and popliteal(below knee) veins. 3. Thrombus appears chronic and recannalized. 4. No evidence of deep vein thrombosis in the contralateral common  femoral vein. 5. Superficial vein(s) visualized include the great saphenous vein. 6. No evidence of superficial thrombosis detected. ADDITIONAL COMMENTS    I have personally reviewed the data relevant to the interpretation of  this  study. TECHNOLOGIST: Josr Sorensen RDCS, RVT  Signed: 2017 01:40 PM    PHYSICIAN: Lisa Emerson.  Jp Winston MD  Signed: 2017 05:08 PM

## 2017-12-12 ENCOUNTER — OFFICE VISIT (OUTPATIENT)
Dept: VASCULAR SURGERY | Age: 27
End: 2017-12-12

## 2017-12-12 VITALS
DIASTOLIC BLOOD PRESSURE: 70 MMHG | SYSTOLIC BLOOD PRESSURE: 118 MMHG | RESPIRATION RATE: 18 BRPM | WEIGHT: 192 LBS | BODY MASS INDEX: 27.49 KG/M2 | HEIGHT: 70 IN | HEART RATE: 80 BPM

## 2017-12-12 DIAGNOSIS — I83.819 VARICOSE VEINS WITH PAIN: Primary | ICD-10-CM

## 2017-12-12 DIAGNOSIS — Z86.718 HISTORY OF DVT (DEEP VEIN THROMBOSIS): ICD-10-CM

## 2017-12-12 NOTE — PROGRESS NOTES
[employer]    Chief Complaint   Patient presents with    Blood Clot       History and Physical    Ms. Roberta Lara returns to our office after undergoing a left leg stab phlebectomy for symptomatic varicose veins with a high GSV ligation. Her post operative course was complicated by a femoral vein DVT and she was started on oral anticoagulation. Past Medical History:   Diagnosis Date    Asthma     as a child    Carpal tunnel syndrome     bilateral    Psychiatric disorder     depression    Thyroid disease     hypothyroidism-takes natural suppliments only     Patient Active Problem List   Diagnosis Code    Varicose veins with pain I83.819    Leg swelling M79.89     Past Surgical History:   Procedure Laterality Date    HX HEENT      wisdom teeth     Current Outpatient Prescriptions   Medication Sig Dispense Refill    rivaroxaban (XARELTO) 20 mg tab tablet Take 1 Tab by mouth daily (with lunch). 30 Tab 4    OTHER 2 Caps. Natural thyroid      OTHER 1 Drop. Vitamin K      MAGNESIUM PO Take 1 Drop by mouth daily.  CHOLECALCIFEROL, VITAMIN D3, (VITAMIN D3 PO) Take  by mouth.  OTHER       oxyCODONE-acetaminophen (PERCOCET) 5-325 mg per tablet Take 2 Tabs by mouth every six (6) hours as needed for Pain. Max Daily Amount: 8 Tabs. 42 Tab 0     Allergies   Allergen Reactions    Bee Venom Protein (Honey Bee) Anaphylaxis     Social History     Social History    Marital status:      Spouse name: N/A    Number of children: N/A    Years of education: N/A     Occupational History    Not on file. Social History Main Topics    Smoking status: Never Smoker    Smokeless tobacco: Never Used    Alcohol use Yes      Comment: type of drink varies but about 1 drink per week.     Drug use: No    Sexual activity: Yes     Other Topics Concern    Not on file     Social History Narrative      Family History   Problem Relation Age of Onset    Other Mother      neurological disorder, varicose veins    Diabetes Father      type 2    Cancer Maternal Grandmother      stage four lung, liver, kidneys and a tumor at base of skull.  Cancer Maternal Grandfather      colon cancer    COPD Maternal Grandfather     Alzheimer Paternal Grandmother        Review of Systems    Review of Systems   Constitutional: Negative for chills, diaphoresis, fever, malaise/fatigue and weight loss. HENT: Negative for hearing loss and sore throat. Eyes: Negative for blurred vision, photophobia and redness. Respiratory: Negative for cough, hemoptysis, shortness of breath and wheezing. Cardiovascular: Negative for chest pain, palpitations and orthopnea. Gastrointestinal: Negative for abdominal pain, blood in stool, constipation, diarrhea, heartburn, nausea and vomiting. Genitourinary: Negative for dysuria, frequency, hematuria and urgency. Musculoskeletal: Negative for back pain and myalgias. Skin: Negative for itching and rash. Neurological: Negative for dizziness, speech change, focal weakness, weakness and headaches. Endo/Heme/Allergies: Does not bruise/bleed easily. Psychiatric/Behavioral: Negative for depression and suicidal ideas. Physical Exam:    Visit Vitals    /70 (BP 1 Location: Left arm, BP Patient Position: Sitting)    Pulse 80    Resp 18    Ht 5' 10\" (1.778 m)    Wt 192 lb (87.1 kg)    BMI 27.55 kg/m2      Physical Examination: General appearance - alert, well appearing, and in no distress  Mental status - alert, oriented to person, place, and time  Eyes - sclera anicteric, left eye normal, right eye normal  Ears - right ear normal, left ear normal  Nose - normal and patent, no erythema, discharge or polyps  Mouth - mucous membranes moist, pharynx normal without lesions  Extremities - Left leg stab phlebectomies well healed. No edema. No prominent varicosities. Impression and Plan:    ICD-10-CM ICD-9-CM    1. Varicose veins with pain I83.819 454.8    2. History of DVT (deep vein thrombosis) Z86.718 V12.51      I am pleased that Ms. Yuriy Miner has improved. So significantly after her stab phlebectomy was complicated by a DVT. She will complete a full 3 month therapy for her DVT and will continue to wear compression stockings. She will see us in follow up in 2 months time. Follow-up Disposition:  Return in about 2 months (around 2/12/2018) for Symptom check. The treatment plan was reviewed with the patient in detail. The patient voiced understanding of this plan and all questions and concerns were addressed. The patient agrees with this plan. We discussed the signs and symptoms that would require earlier attention or intervention. The patient was given educational material related to his/her visit and the patient has voiced understanding of the material.     I appreciate the opportunity to participate in the care of your patient. I will be sure to keep you informed of any subsequent changes in the treatment plan. If you have any questions or concerns, please feel free to contact me. Chad Aguilera MD    PLEASE NOTE:  This document has been produced using voice recognition software. Unrecognized errors in transcription may be present.

## 2017-12-27 ENCOUNTER — TELEPHONE (OUTPATIENT)
Dept: VASCULAR SURGERY | Age: 27
End: 2017-12-27

## 2017-12-27 NOTE — TELEPHONE ENCOUNTER
Patient called answering service complaining about hands and feet going numb and very painful. Can you please call patient.

## 2018-01-02 NOTE — TELEPHONE ENCOUNTER
Called and left message for patient to call back . Patient called and states she had gone to the ED and was diagnosed with panic attack but that all was good now and symptoms have resolved. She wanted to make sure that Dr. Mita Sow was aware and to thank him since she had called him while he was on call . Advised I would pass on the message.
